# Patient Record
Sex: MALE | Race: WHITE | Employment: OTHER | URBAN - METROPOLITAN AREA
[De-identification: names, ages, dates, MRNs, and addresses within clinical notes are randomized per-mention and may not be internally consistent; named-entity substitution may affect disease eponyms.]

---

## 2017-02-06 RX ORDER — ATORVASTATIN CALCIUM 20 MG/1
TABLET, FILM COATED ORAL
Qty: 90 TABLET | Refills: 0 | Status: SHIPPED | OUTPATIENT
Start: 2017-02-06 | End: 2017-04-29

## 2017-02-07 ENCOUNTER — OFFICE VISIT (OUTPATIENT)
Dept: FAMILY MEDICINE CLINIC | Facility: CLINIC | Age: 72
End: 2017-02-07

## 2017-02-07 VITALS
SYSTOLIC BLOOD PRESSURE: 108 MMHG | HEIGHT: 73 IN | RESPIRATION RATE: 12 BRPM | DIASTOLIC BLOOD PRESSURE: 74 MMHG | WEIGHT: 181 LBS | TEMPERATURE: 97 F | BODY MASS INDEX: 23.99 KG/M2 | HEART RATE: 64 BPM

## 2017-02-07 DIAGNOSIS — E78.00 PURE HYPERCHOLESTEROLEMIA: Primary | ICD-10-CM

## 2017-02-07 DIAGNOSIS — E04.2 MULTINODULAR THYROID: ICD-10-CM

## 2017-02-07 DIAGNOSIS — Z00.00 ENCOUNTER FOR ANNUAL HEALTH EXAMINATION: ICD-10-CM

## 2017-02-07 DIAGNOSIS — R20.9 DISTURBANCE OF SKIN SENSATION: ICD-10-CM

## 2017-02-07 DIAGNOSIS — N52.9 ERECTILE DYSFUNCTION, UNSPECIFIED ERECTILE DYSFUNCTION TYPE: ICD-10-CM

## 2017-02-07 PROCEDURE — 99213 OFFICE O/P EST LOW 20 MIN: CPT | Performed by: FAMILY MEDICINE

## 2017-02-07 PROCEDURE — G0439 PPPS, SUBSEQ VISIT: HCPCS | Performed by: FAMILY MEDICINE

## 2017-02-07 NOTE — PROGRESS NOTES
HPI:   Leo Segovia is a 66year old male who presents for a Medicare Subsequent Annual Wellness visit (Pt already had Initial Annual Wellness). Leo Segovia is a 66year old male who presents for recheck of hyperlipidemia.  Patient reports t Cancer of skin, face; Chest pain; Thyroid nodule; and Cancer ([facility] [address] He  has past surgical history that includes tonsillectomy (1947); hernia surgery (1995); other surgical history (1994); and cholecystectomy (12/29/11).     His family history includes Ca mucosa, and tongue normal; teeth and gums normal   Neck: Supple, symmetrical, trachea midline, no adenopathy, thyroid: not enlarged, symmetric, no tenderness/mass/nodules, no carotid bruit or JVD   Lungs:   Clear to auscultation bilaterally, respirations u  on file in Epic:    Nora Bills does not have a Power of  for [employer] on file in [address] [place] [address] Rd. Discussed with patient and provided information          PLAN:  The patient indicates understanding of these issues and agrees to the plan. affect your day to day activities?: 0-No     Have you had any memory issues?: 0-No    Fall/Risk Scorin    Scoring Interpretation: 0 - 3 No Risk     Depression Screening (PHQ-2/PHQ-9): Over the LAST 2 WEEKS   Little interest or pleasure in doing things Annually No results found for: FOB No flowsheet data found. Glaucoma Screening      Ophthalmology Visit Annually: Diabetics, FHx Glaucoma, AA>50, > 65 No flowsheet data found.     Prostate Cancer Screening      PSA  Annually PSA due on 12/09/2018

## 2017-02-07 NOTE — PATIENT INSTRUCTIONS
Hever Curtis's SCREENING SCHEDULE   Tests on this list are recommended by your physician but may not be covered, or covered at this frequency, by your insurer. Please check with your insurance carrier before scheduling to verify coverage.     PREVENT to patients who meet one of the following criteria:   • Men who are 73-68 years old and have smoked more than 100 cigarettes in their lifetime   • Anyone with a family history    Colorectal Cancer Screening Covered up to Age 76     Colonoscopy Screen   Cov Homosexual men   Illicit injectable drug abusers     Tetanus Toxoid- Only covered with a cut with metal- TD and TDaP Not covered by Medicare Part B) No orders found for this or any previous visit.  This may be covered with your prescription benefits, but

## 2017-02-12 PROBLEM — E04.2 MULTINODULAR THYROID: Status: ACTIVE | Noted: 2017-02-12

## 2017-02-12 PROBLEM — N52.9 ERECTILE DYSFUNCTION: Status: ACTIVE | Noted: 2017-02-12

## 2017-03-08 ENCOUNTER — PATIENT MESSAGE (OUTPATIENT)
Dept: FAMILY MEDICINE CLINIC | Facility: CLINIC | Age: 72
End: 2017-03-08

## 2017-03-09 NOTE — TELEPHONE ENCOUNTER
From: Dixie Pop  To: Yandy Mcgee MD  Sent: 3/8/2017 9:28 AM CST  Subject: Other    Received my pneumonia booster shot yesterday 3/7/17. Please add it to my records.  Also, [facility] records only go back 4 years and they don't show me hav

## 2017-05-01 RX ORDER — ATORVASTATIN CALCIUM 20 MG/1
TABLET, FILM COATED ORAL
Qty: 90 TABLET | Refills: 0 | Status: SHIPPED | OUTPATIENT
Start: 2017-05-01 | End: 2017-07-27

## 2017-07-27 RX ORDER — ATORVASTATIN CALCIUM 20 MG/1
20 TABLET, FILM COATED ORAL
Qty: 90 TABLET | Refills: 1 | Status: SHIPPED | OUTPATIENT
Start: 2017-07-27 | End: 2018-01-25

## 2017-08-25 ENCOUNTER — HOSPITAL ENCOUNTER (OUTPATIENT)
Dept: ULTRASOUND IMAGING | Facility: HOSPITAL | Age: 72
Discharge: HOME | End: 2017-08-25
Attending: OTOLARYNGOLOGY
Payer: MEDICARE

## 2017-08-25 DIAGNOSIS — E04.1 THYROID NODULE: ICD-10-CM

## 2017-08-25 PROCEDURE — 76536 US EXAM OF HEAD AND NECK: CPT | Performed by: OTOLARYNGOLOGY

## 2017-12-11 ENCOUNTER — APPOINTMENT (OUTPATIENT)
Dept: GENERAL RADIOLOGY | Facility: HOSPITAL | Age: 72
End: 2017-12-11
Payer: MEDICARE

## 2017-12-11 ENCOUNTER — APPOINTMENT (OUTPATIENT)
Dept: CV DIAGNOSTICS | Facility: HOSPITAL | Age: 72
End: 2017-12-11
Attending: EMERGENCY MEDICINE
Payer: MEDICARE

## 2017-12-11 ENCOUNTER — HOSPITAL ENCOUNTER (EMERGENCY)
Facility: HOSPITAL | Age: 72
Discharge: HOME | End: 2017-12-11
Attending: EMERGENCY MEDICINE
Payer: MEDICARE

## 2017-12-11 VITALS
HEIGHT: 74 IN | DIASTOLIC BLOOD PRESSURE: 72 MMHG | SYSTOLIC BLOOD PRESSURE: 127 MMHG | TEMPERATURE: 98 F | OXYGEN SATURATION: 98 % | RESPIRATION RATE: 16 BRPM | BODY MASS INDEX: 22.46 KG/M2 | WEIGHT: 175 LBS | HEART RATE: 70 BPM

## 2017-12-11 DIAGNOSIS — R07.89 CHEST PAIN, NON-CARDIAC: Primary | ICD-10-CM

## 2017-12-11 PROCEDURE — 93350 STRESS TTE ONLY: CPT | Performed by: EMERGENCY MEDICINE

## 2017-12-11 PROCEDURE — 36415 COLL VENOUS BLD VENIPUNCTURE: CPT

## 2017-12-11 PROCEDURE — 84484 ASSAY OF TROPONIN QUANT: CPT

## 2017-12-11 PROCEDURE — 80053 COMPREHEN METABOLIC PANEL: CPT | Performed by: EMERGENCY MEDICINE

## 2017-12-11 PROCEDURE — 85025 COMPLETE CBC W/AUTO DIFF WBC: CPT | Performed by: EMERGENCY MEDICINE

## 2017-12-11 PROCEDURE — 80053 COMPREHEN METABOLIC PANEL: CPT

## 2017-12-11 PROCEDURE — 99285 EMERGENCY DEPT VISIT HI MDM: CPT

## 2017-12-11 PROCEDURE — 93010 ELECTROCARDIOGRAM REPORT: CPT

## 2017-12-11 PROCEDURE — 93018 CV STRESS TEST I&R ONLY: CPT | Performed by: EMERGENCY MEDICINE

## 2017-12-11 PROCEDURE — 71010 XR CHEST AP PORTABLE  (CPT=71010): CPT

## 2017-12-11 PROCEDURE — 85025 COMPLETE CBC W/AUTO DIFF WBC: CPT

## 2017-12-11 PROCEDURE — 84484 ASSAY OF TROPONIN QUANT: CPT | Performed by: EMERGENCY MEDICINE

## 2017-12-11 PROCEDURE — 93005 ELECTROCARDIOGRAM TRACING: CPT

## 2017-12-11 PROCEDURE — 93017 CV STRESS TEST TRACING ONLY: CPT | Performed by: EMERGENCY MEDICINE

## 2017-12-11 PROCEDURE — 85378 FIBRIN DEGRADE SEMIQUANT: CPT | Performed by: PHYSICIAN ASSISTANT

## 2017-12-11 PROCEDURE — 85610 PROTHROMBIN TIME: CPT | Performed by: PHYSICIAN ASSISTANT

## 2017-12-11 RX ORDER — ASPIRIN 81 MG/1
324 TABLET, CHEWABLE ORAL ONCE
Status: COMPLETED | OUTPATIENT
Start: 2017-12-11 | End: 2017-12-11

## 2017-12-11 NOTE — ED INITIAL ASSESSMENT (HPI)
Pt c/o chest pain for past week with sharp pain with deep inspiration, stiffness to L arm, intermittent JOSH, denies cough, n/v.

## 2018-01-25 DIAGNOSIS — E78.00 PURE HYPERCHOLESTEROLEMIA: Primary | ICD-10-CM

## 2018-01-25 RX ORDER — ATORVASTATIN CALCIUM 20 MG/1
TABLET, FILM COATED ORAL
Qty: 90 TABLET | Refills: 0 | Status: SHIPPED | OUTPATIENT
Start: 2018-01-25 | End: 2018-05-05

## 2018-01-25 NOTE — TELEPHONE ENCOUNTER
I have sent rx but pt is due for vs next month for cholesterol, overdue for labs too, in system, fasting. Please have him schedule thanks.

## 2018-03-08 ENCOUNTER — OFFICE VISIT (OUTPATIENT)
Dept: FAMILY MEDICINE CLINIC | Facility: CLINIC | Age: 73
End: 2018-03-08

## 2018-03-08 VITALS
SYSTOLIC BLOOD PRESSURE: 130 MMHG | DIASTOLIC BLOOD PRESSURE: 78 MMHG | RESPIRATION RATE: 18 BRPM | TEMPERATURE: 98 F | HEIGHT: 73 IN | BODY MASS INDEX: 24.52 KG/M2 | WEIGHT: 185 LBS | OXYGEN SATURATION: 98 % | HEART RATE: 65 BPM

## 2018-03-08 DIAGNOSIS — R42 VERTIGO: Primary | ICD-10-CM

## 2018-03-08 PROCEDURE — 99214 OFFICE O/P EST MOD 30 MIN: CPT | Performed by: FAMILY MEDICINE

## 2018-03-08 NOTE — PROGRESS NOTES
Debby Mtz is a 74year old male. HPI:   Last night felt a little wobbly when he got up. Walked up to go to bed and developed vertigo. Lasted 15-20 minutes all told. No recent URI's. No head injury. No chest pain or SOB. No palpitations.      Anila Zamora adenopathy,no bruits  LUNGS: clear to auscultation  CARDIO: RRR without murmur  EXTREMITIES: no cyanosis, clubbing or edema  NEURO: Cranial nerves II through XII are grossly intact, specifically no evidence of nystagmus    ASSESSMENT AND PLAN:   Vertigo  (

## 2018-05-05 DIAGNOSIS — E78.00 PURE HYPERCHOLESTEROLEMIA: ICD-10-CM

## 2018-05-07 RX ORDER — ATORVASTATIN CALCIUM 20 MG/1
TABLET, FILM COATED ORAL
Qty: 90 TABLET | Refills: 0 | Status: SHIPPED | OUTPATIENT
Start: 2018-05-07 | End: 2018-08-09

## 2018-05-15 ENCOUNTER — LAB ENCOUNTER (OUTPATIENT)
Dept: LAB | Age: 73
End: 2018-05-15
Attending: FAMILY MEDICINE
Payer: MEDICARE

## 2018-05-15 DIAGNOSIS — E78.00 PURE HYPERCHOLESTEROLEMIA: ICD-10-CM

## 2018-05-15 PROCEDURE — 36415 COLL VENOUS BLD VENIPUNCTURE: CPT

## 2018-05-15 PROCEDURE — 80061 LIPID PANEL: CPT

## 2018-05-15 PROCEDURE — 80053 COMPREHEN METABOLIC PANEL: CPT

## 2018-05-18 ENCOUNTER — APPOINTMENT (OUTPATIENT)
Dept: LAB | Age: 73
End: 2018-05-18
Attending: FAMILY MEDICINE
Payer: MEDICARE

## 2018-05-18 ENCOUNTER — OFFICE VISIT (OUTPATIENT)
Dept: FAMILY MEDICINE CLINIC | Facility: CLINIC | Age: 73
End: 2018-05-18

## 2018-05-18 VITALS
SYSTOLIC BLOOD PRESSURE: 134 MMHG | RESPIRATION RATE: 12 BRPM | HEIGHT: 73 IN | DIASTOLIC BLOOD PRESSURE: 70 MMHG | HEART RATE: 60 BPM | BODY MASS INDEX: 24.12 KG/M2 | WEIGHT: 182 LBS | TEMPERATURE: 98 F

## 2018-05-18 DIAGNOSIS — R20.9 DISTURBANCE OF SKIN SENSATION: ICD-10-CM

## 2018-05-18 DIAGNOSIS — Z00.00 ENCOUNTER FOR ANNUAL HEALTH EXAMINATION: Primary | ICD-10-CM

## 2018-05-18 DIAGNOSIS — N40.0 ENLARGED PROSTATE WITHOUT LOWER URINARY TRACT SYMPTOMS (LUTS): ICD-10-CM

## 2018-05-18 DIAGNOSIS — E78.00 PURE HYPERCHOLESTEROLEMIA: ICD-10-CM

## 2018-05-18 DIAGNOSIS — Z12.5 SCREENING FOR PROSTATE CANCER: ICD-10-CM

## 2018-05-18 DIAGNOSIS — E04.2 MULTINODULAR THYROID: ICD-10-CM

## 2018-05-18 PROCEDURE — 99213 OFFICE O/P EST LOW 20 MIN: CPT | Performed by: FAMILY MEDICINE

## 2018-05-18 PROCEDURE — 36415 COLL VENOUS BLD VENIPUNCTURE: CPT

## 2018-05-18 PROCEDURE — G0439 PPPS, SUBSEQ VISIT: HCPCS | Performed by: FAMILY MEDICINE

## 2018-05-18 PROCEDURE — 84153 ASSAY OF PSA TOTAL: CPT

## 2018-05-18 NOTE — PATIENT INSTRUCTIONS
Hever Curtis's SCREENING SCHEDULE   Tests on this list are recommended by your physician but may not be covered, or covered at this frequency, by your insurer. Please check with your insurance carrier before scheduling to verify coverage.     PREVENT Visit    Abdominal aortic aneurysm screening (once between ages 73-68)  No results found for this or any previous visit.  Limited to patients who meet one of the following criteria:   • Men who are 73-68 years old and have smoked more than 100 cigarettes in concentrates   Clients of institutions for the mentally retarded   Persons who live in the same house as a HepB virus carrier   Homosexual men   Illicit injectable drug abusers     Tetanus Toxoid- Only covered with a cut with metal- TD and TDaP Not covered

## 2018-05-18 NOTE — PROGRESS NOTES
HPI:   Ju Flores is a 74year old male who presents for a Medicare Subsequent Annual Wellness visit (Pt already had Initial Annual Wellness). Ju Flores is a 74year old male who presents for recheck of hyperlipidemia.  Patient reports t Cancer ([facility] [address] Cancer of skin, face; Chest pain; and Thyroid nodule. He  has a past surgical history that includes tonsillectomy (1947); hernia surgery (1995); other surgical history (1994); and cholecystectomy (12/29/11).     His family history includes to auscultation bilaterally, respirations unlabored   Chest Wall:  No tenderness or deformity   Heart:  Regular rate and rhythm, S1, S2 normal, no murmur, rub or gallop   Extremities: Extremities normal, atraumatic, no cyanosis or edema   Pulses: 2+ and sy General Health     In the past six months, have you lost more than 10 pounds without trying?: 2 - No    Has your appetite been poor?: No    How does the patient maintain a good energy level?: Stretching    How would you describe your daily physical a input here.   Cognitive Assessment     What day of the week is this?: Correct    What month is it?: Correct    What year is it?: Correct    Recall \"Ball\": Correct    Recall \"Flag\": Correct    Recall \"Tree\": Correct       This section provided for quic or any previous visit. Tetanus No orders found for this or any previous visit.          SPECIFIC DISEASE MONITORING Internal Lab or Procedure External Lab or Procedure   Annual Monitoring of Persistent     Medications (ACE/ARB, digoxin diuretics, antico

## 2018-08-09 DIAGNOSIS — E78.00 PURE HYPERCHOLESTEROLEMIA: ICD-10-CM

## 2018-08-10 RX ORDER — ATORVASTATIN CALCIUM 20 MG/1
TABLET, FILM COATED ORAL
Qty: 90 TABLET | Refills: 0 | Status: SHIPPED | OUTPATIENT
Start: 2018-08-10 | End: 2018-11-08

## 2018-10-04 ENCOUNTER — TELEPHONE (OUTPATIENT)
Dept: FAMILY MEDICINE CLINIC | Facility: CLINIC | Age: 73
End: 2018-10-04

## 2018-10-25 ENCOUNTER — OFFICE VISIT (OUTPATIENT)
Dept: FAMILY MEDICINE CLINIC | Facility: CLINIC | Age: 73
End: 2018-10-25
Payer: MEDICARE

## 2018-10-25 ENCOUNTER — LAB ENCOUNTER (OUTPATIENT)
Dept: LAB | Age: 73
End: 2018-10-25
Attending: OTOLARYNGOLOGY
Payer: MEDICARE

## 2018-10-25 VITALS
HEART RATE: 68 BPM | HEIGHT: 73 IN | BODY MASS INDEX: 23.99 KG/M2 | WEIGHT: 181 LBS | RESPIRATION RATE: 16 BRPM | DIASTOLIC BLOOD PRESSURE: 72 MMHG | SYSTOLIC BLOOD PRESSURE: 130 MMHG | TEMPERATURE: 98 F

## 2018-10-25 DIAGNOSIS — E04.2 MULTINODULAR THYROID: ICD-10-CM

## 2018-10-25 DIAGNOSIS — S46.011A STRAIN OF TENDON OF RIGHT ROTATOR CUFF, INITIAL ENCOUNTER: Primary | ICD-10-CM

## 2018-10-25 DIAGNOSIS — E07.9 THYROID DYSFUNCTION: ICD-10-CM

## 2018-10-25 PROCEDURE — 99213 OFFICE O/P EST LOW 20 MIN: CPT | Performed by: FAMILY MEDICINE

## 2018-10-25 PROCEDURE — 84443 ASSAY THYROID STIM HORMONE: CPT

## 2018-10-25 PROCEDURE — 36415 COLL VENOUS BLD VENIPUNCTURE: CPT

## 2018-10-25 PROCEDURE — 84439 ASSAY OF FREE THYROXINE: CPT

## 2018-10-25 NOTE — PROGRESS NOTES
Right shoulder pain    Patient is here with chief complaint of right shoulder pain that he has had for several months. He has no history of any injury.   He finds that it does not interfere with golfing but has difficulty reaching for objects above his carroll

## 2018-10-31 ENCOUNTER — OFFICE VISIT (OUTPATIENT)
Dept: PHYSICAL THERAPY | Age: 73
End: 2018-10-31
Attending: FAMILY MEDICINE
Payer: MEDICARE

## 2018-10-31 DIAGNOSIS — S46.011A STRAIN OF TENDON OF RIGHT ROTATOR CUFF, INITIAL ENCOUNTER: ICD-10-CM

## 2018-10-31 PROCEDURE — 97110 THERAPEUTIC EXERCISES: CPT

## 2018-10-31 PROCEDURE — 97162 PT EVAL MOD COMPLEX 30 MIN: CPT

## 2018-10-31 NOTE — PROGRESS NOTES
SPINE EVALUATION:   Referring Physician: Dr. Eda Umana  Diagnosis: right shoulder sub acromial impingement with rotator cuff dysfunction     Date of Service: 10/31/2018     PATIENT SUMMARY   Hunter Trujillo is a 74year old y/o male who presents to  None  OBJECTIVE:   Observation/Posture: winging noted  In the right scapula is mild, impaired scapulohumeral rhthym with elevation.      Shoulder ROM:   Flexion: R 135* L170  Abduction: R 140* L 170  External rotation with arm by side: R 31*; L 71  Internal deodorant, don/doff shirts, and wash hair (12 visits)  · Pt will improve shoulder strength to 5-/5 to improve function with ADLs such as carrying a bag of groceries (12 visits)  · Pt will demonstrate increased mid/low trap strength to 5/5 to promote improv

## 2018-11-06 ENCOUNTER — OFFICE VISIT (OUTPATIENT)
Dept: PHYSICAL THERAPY | Age: 73
End: 2018-11-06
Attending: FAMILY MEDICINE
Payer: MEDICARE

## 2018-11-06 PROCEDURE — 97110 THERAPEUTIC EXERCISES: CPT

## 2018-11-06 PROCEDURE — 97140 MANUAL THERAPY 1/> REGIONS: CPT

## 2018-11-06 NOTE — PROGRESS NOTES
Dx: right shoulder sub acromial impingement with rotator cuff dysfunction            Authorized # of Visits:  12 requested          Next MD visit: none scheduled  Fall Risk: standard         Precautions: n/a           Subjective: states he did do the exerc TX#: 8/   UBE x 6 min level 1          HEP review: Isometric IR red band and ER (yellow band)   Reassess ROM           Hook lying  -GIII Posterior mobilization 10 x 2 with arm in neutral second set in slight IR   -GIII Inferior mobilization 10 x 2 with

## 2018-11-08 ENCOUNTER — OFFICE VISIT (OUTPATIENT)
Dept: PHYSICAL THERAPY | Age: 73
End: 2018-11-08
Attending: FAMILY MEDICINE
Payer: MEDICARE

## 2018-11-08 DIAGNOSIS — E78.00 PURE HYPERCHOLESTEROLEMIA: ICD-10-CM

## 2018-11-08 PROCEDURE — 97110 THERAPEUTIC EXERCISES: CPT

## 2018-11-08 PROCEDURE — 97140 MANUAL THERAPY 1/> REGIONS: CPT

## 2018-11-08 RX ORDER — ATORVASTATIN CALCIUM 20 MG/1
TABLET, FILM COATED ORAL
Qty: 90 TABLET | Refills: 0 | Status: SHIPPED | OUTPATIENT
Start: 2018-11-08 | End: 2018-11-15 | Stop reason: DRUGHIGH

## 2018-11-08 NOTE — PROGRESS NOTES
Dx: right shoulder sub acromial impingement with rotator cuff dysfunction            Authorized # of Visits:  12 requested          Next MD visit: none scheduled  Fall Risk: standard         Precautions: n/a           Subjective: soreness noted after doing ER (yellow band)   Reassess ROM   Prone retraction for scapular activation 15 x 2         Hook lying  -GIII Posterior mobilization 10 x 2 with arm in neutral second set in slight IR   -GIII Inferior mobilization 10 x 2 with arm at 45 deg, 10 x 1 at 60 deg

## 2018-11-13 ENCOUNTER — OFFICE VISIT (OUTPATIENT)
Dept: PHYSICAL THERAPY | Age: 73
End: 2018-11-13
Attending: FAMILY MEDICINE
Payer: MEDICARE

## 2018-11-13 PROCEDURE — 97110 THERAPEUTIC EXERCISES: CPT

## 2018-11-13 PROCEDURE — 97140 MANUAL THERAPY 1/> REGIONS: CPT

## 2018-11-13 NOTE — PROGRESS NOTES
Dx: right shoulder sub acromial impingement with rotator cuff dysfunction            Authorized # of Visits:  12 requested          Next MD visit: none scheduled  Fall Risk: standard         Precautions: n/a           Subjective: soreness noted after doing retraction for scapular activation 15 x 2  1# for the third set        Hook lying  -GIII Posterior mobilization 10 x 2 with arm in neutral second set in slight IR   -GIII Inferior mobilization 10 x 2 with arm at 45 deg, 10 x 1 at 60 deg  Hook lying  -GIII specific exercises selected based on impairments to improve ROM, strength and/or motor control; education,  Demonstration and cueing to achieve optimal improvement and decrease risk of injury  Charges: MT x 1 TE x 2       Total Timed Treatment: 43 min  Tot

## 2018-11-14 ENCOUNTER — LAB ENCOUNTER (OUTPATIENT)
Dept: LAB | Age: 73
End: 2018-11-14
Attending: FAMILY MEDICINE
Payer: MEDICARE

## 2018-11-14 DIAGNOSIS — E07.9 THYROID DYSFUNCTION: ICD-10-CM

## 2018-11-14 DIAGNOSIS — E78.00 PURE HYPERCHOLESTEROLEMIA: ICD-10-CM

## 2018-11-14 PROCEDURE — 36415 COLL VENOUS BLD VENIPUNCTURE: CPT

## 2018-11-14 PROCEDURE — 84439 ASSAY OF FREE THYROXINE: CPT

## 2018-11-14 PROCEDURE — 84443 ASSAY THYROID STIM HORMONE: CPT

## 2018-11-14 PROCEDURE — 80061 LIPID PANEL: CPT

## 2018-11-14 PROCEDURE — 80053 COMPREHEN METABOLIC PANEL: CPT

## 2018-11-15 ENCOUNTER — APPOINTMENT (OUTPATIENT)
Dept: PHYSICAL THERAPY | Age: 73
End: 2018-11-15
Attending: FAMILY MEDICINE
Payer: MEDICARE

## 2018-11-15 PROCEDURE — 97140 MANUAL THERAPY 1/> REGIONS: CPT

## 2018-11-15 PROCEDURE — 97110 THERAPEUTIC EXERCISES: CPT

## 2018-11-15 RX ORDER — ATORVASTATIN CALCIUM 40 MG/1
40 TABLET, FILM COATED ORAL NIGHTLY
Qty: 90 TABLET | Refills: 0 | Status: SHIPPED | OUTPATIENT
Start: 2018-11-15 | End: 2019-02-01

## 2018-11-15 NOTE — PROGRESS NOTES
Dx: right shoulder sub acromial impingement with rotator cuff dysfunction            Authorized # of Visits:  12 requested          Next MD visit: none scheduled  Fall Risk: standard         Precautions: n/a           Subjective: slept great last night for scapular activation 15 x 2  Prone retraction for scapular activation 15 x 2  1# for the third set  Prone retraction for scapular activation 15 x 2 1#   Row 2# prone with scap       Hook lying  -GIII Posterior mobilization 10 x 2 with arm in neutral second YTB  Scap retraction with iso ER seated 15 x 2 YTB  Scap retraction with iso ER seated 15 x 2 YTB  Wall scap retraction 10 x 2 sets with YTB Scap retraction with iso ER seated 15 x 2 YTB  Wall scap retraction 10 x 2 sets with YTB       Cane AAROM flexion 1

## 2018-11-19 ENCOUNTER — OFFICE VISIT (OUTPATIENT)
Dept: PHYSICAL THERAPY | Age: 73
End: 2018-11-19
Attending: FAMILY MEDICINE
Payer: MEDICARE

## 2018-11-19 PROCEDURE — 97140 MANUAL THERAPY 1/> REGIONS: CPT

## 2018-11-19 PROCEDURE — 97110 THERAPEUTIC EXERCISES: CPT

## 2018-11-19 NOTE — PROGRESS NOTES
Dx: right shoulder sub acromial impingement with rotator cuff dysfunction            Authorized # of Visits:  12 requested          Next MD visit: none scheduled  Fall Risk: standard         Precautions: n/a           Subjective: states that the shoulder h 6 min level 1  UBE x 6 min level 1  UBE x 6 min level 1  UBE x 6 min level 1 UBE x 6 min level 1     HEP review: Isometric IR red band and ER (yellow band)   Reassess ROM   Prone retraction for scapular activation 15 x 2  Prone retraction for scapular acti 2 (small range)  Standing IR RTB resisted with towel roll under arm  15 x 2  Standing ER YTB resisted with towel under arm 15 x 2 (small range)  Standing IR RTB resisted with towel roll under arm  15 x 2  ER and IR standing RTB both 15 x 2  ER and IR stand

## 2018-11-21 ENCOUNTER — OFFICE VISIT (OUTPATIENT)
Dept: PHYSICAL THERAPY | Age: 73
End: 2018-11-21
Attending: FAMILY MEDICINE
Payer: MEDICARE

## 2018-11-21 PROCEDURE — 97110 THERAPEUTIC EXERCISES: CPT

## 2018-11-21 PROCEDURE — 97140 MANUAL THERAPY 1/> REGIONS: CPT

## 2018-11-21 NOTE — PROGRESS NOTES
Dx: right shoulder sub acromial impingement with rotator cuff dysfunction            Authorized # of Visits:  12 requested          Next MD visit: none scheduled  Fall Risk: standard         Precautions: n/a           Subjective: not sure if he is making g 1  UBE x 6 min level 1  UBE x 6 min level 1 UBE x 6 min level 1 UBE x 6 min level 1    HEP review: Isometric IR red band and ER (yellow band)   Reassess ROM   Prone retraction for scapular activation 15 x 2  Prone retraction for scapular activation 15 x 2 below shoulder height 12 x 2   Abduction 12 x 2 below shoulder height    Standing Small above painful arc 10 x 2 flex     Standing ER YTB resisted with towel under arm 15 x 2 (small range)  Standing IR RTB resisted with towel roll under arm  15 x 2  Standi

## 2018-11-26 ENCOUNTER — APPOINTMENT (OUTPATIENT)
Dept: PHYSICAL THERAPY | Age: 73
End: 2018-11-26
Attending: FAMILY MEDICINE
Payer: MEDICARE

## 2018-11-27 ENCOUNTER — OFFICE VISIT (OUTPATIENT)
Dept: PHYSICAL THERAPY | Age: 73
End: 2018-11-27
Attending: FAMILY MEDICINE
Payer: MEDICARE

## 2018-11-27 PROCEDURE — 97112 NEUROMUSCULAR REEDUCATION: CPT

## 2018-11-27 PROCEDURE — 97110 THERAPEUTIC EXERCISES: CPT

## 2018-11-27 NOTE — PROGRESS NOTES
Dx: right shoulder sub acromial impingement with rotator cuff dysfunction            Authorized # of Visits:  12 requested          Next MD visit: none scheduled  Fall Risk: standard         Precautions: n/a           Subjective: states the nights are bad TX#: 6/ Date: 11/21/2018               TX#: 7/ Date: 11/27/2018                 TX#: 8/   UBE x 6 min level 1  UBE x 6 min level 1  UBE x 6 min level 1  UBE x 6 min level 1 UBE x 6 min level 1 UBE x 6 min level 1 UBE x 6 min level 1   HEP review: Iso flexion with slow PNF Rhythmic stabilizaiton in all directions   20 sec x 5 reps with 20-30 sec breaks   Hook lying Flexion 15 x 2, avoid end range at top  Sustained flexion with slow PNF Rhythmic stabilizaiton in all directions   20 sec x 5 reps with 20-3 stabilization and forward arm taps 6 x 2 both arms  Plank with scap retraction sustained 30 sec x 2    Push up plus of scaps x 10 x 2     Plank  With stabilization and forward arm taps 6 x 2 both arms    Skilled Services: manual therapy to include interven

## 2018-11-29 ENCOUNTER — OFFICE VISIT (OUTPATIENT)
Dept: PHYSICAL THERAPY | Age: 73
End: 2018-11-29
Attending: FAMILY MEDICINE
Payer: MEDICARE

## 2018-11-29 PROCEDURE — 97110 THERAPEUTIC EXERCISES: CPT

## 2018-11-29 PROCEDURE — 97112 NEUROMUSCULAR REEDUCATION: CPT

## 2018-11-29 NOTE — PROGRESS NOTES
Dx: right shoulder sub acromial impingement with rotator cuff dysfunction            Authorized # of Visits:  12 requested          Next MD visit: none scheduled  Fall Risk: standard         Precautions: n/a           Subjective: feel like I am making prog level 1  UBE x 6 min level 1 UBE x 6 min level 1 UBE x 6 min level 1 UBE x 6 min level 1 UBE x 6 min level 1   HEP review: Isometric IR red band and ER (yellow band)   Reassess ROM   Prone retraction for scapular activation 15 x 2  Prone retraction for sca flexion with slow PNF Rhythmic stabilizaiton in all directions   20 sec x 5 reps with 20-30 sec breaks   Hook lying Flexion 15 x 2, avoid end range at top  Sustained flexion with slow PNF Rhythmic stabilizaiton in all directions   20 sec x 5 reps with 20-3 30 sec x 2    Push up plus of scaps x 10 x 2     Plank  With stabilization and forward arm taps 6 x 2 both arms  Plank with scap retraction sustained 30 sec x 2    Push up plus of scaps x 10 x 2     Plank  With stabilization and forward arm taps 6 x 2 both

## 2018-12-03 ENCOUNTER — OFFICE VISIT (OUTPATIENT)
Dept: PHYSICAL THERAPY | Age: 73
End: 2018-12-03
Attending: FAMILY MEDICINE
Payer: MEDICARE

## 2018-12-03 PROCEDURE — 97110 THERAPEUTIC EXERCISES: CPT

## 2018-12-03 PROCEDURE — 97112 NEUROMUSCULAR REEDUCATION: CPT

## 2018-12-03 NOTE — PROGRESS NOTES
Dx: right shoulder sub acromial impingement with rotator cuff dysfunction            Authorized # of Visits:  12 requested          Next MD visit: none scheduled  Fall Risk: standard         Precautions: n/a           Subjective: did not do exercises over extension off the edge, 10 x with scap assist  Flexion small ROM with scap assist 10x  Prone shoulder extension off the edge, 10 x with scap assist  Flexion small ROM with scap assist 10x  Prone shoulder extension off the edge, 10 x with scap assist  Flexi 30 sec x 2    Push up plus of scaps x 10 x 2     Plank  With stabilization and forward arm taps 6 x 2 both arms  Plank with scap retraction sustained 30 sec x 2    Push up plus of scaps x 10 x 2     Plank  With stabilization and forward arm taps 6 x 2 both

## 2018-12-06 ENCOUNTER — OFFICE VISIT (OUTPATIENT)
Dept: PHYSICAL THERAPY | Age: 73
End: 2018-12-06
Attending: FAMILY MEDICINE
Payer: MEDICARE

## 2018-12-06 PROCEDURE — 97110 THERAPEUTIC EXERCISES: CPT

## 2018-12-06 PROCEDURE — 97112 NEUROMUSCULAR REEDUCATION: CPT

## 2018-12-06 NOTE — PROGRESS NOTES
Dx: right shoulder sub acromial impingement with rotator cuff dysfunction            Authorized # of Visits:  15-18 requested          Next MD visit: none scheduled  Fall Risk: standard         Precautions: n/a           Subjective:improving sleep quality, activation 15 x 2 2#   Row 2# prone with scap  Prone retraction for scapular activation 15 x 2 2#   Row 2# prone with scap Prone shoulder extension off the edge, 10 x with scap assist  Flexion small ROM with scap assist 10x  Prone shoulder extension off th with iso ER seated 15 x 2 YTB  Wall scap retraction 10 x 2 sets with YTB Scap retraction with iso ER seated 15 x 2 YTB  Wall scap retraction 10 x 2 sets with YTB Serratus wall slides foam roller on the wall 10 x 2  Serratus wall slides foam roller on the w

## 2018-12-12 ENCOUNTER — OFFICE VISIT (OUTPATIENT)
Dept: PHYSICAL THERAPY | Age: 73
End: 2018-12-12
Attending: FAMILY MEDICINE
Payer: MEDICARE

## 2018-12-12 PROCEDURE — 97140 MANUAL THERAPY 1/> REGIONS: CPT

## 2018-12-12 PROCEDURE — 97110 THERAPEUTIC EXERCISES: CPT

## 2018-12-12 NOTE — PROGRESS NOTES
Dx: right shoulder sub acromial impingement with rotator cuff dysfunction            Authorized # of Visits:  15-18 requested          Next MD visit: none scheduled  Fall Risk: standard         Precautions: n/a           Subjective:noticing less pain - but Prone retraction for scapular activation 15 x 2 2#   Row 2# prone with scap  Prone retraction for scapular activation 15 x 2 2#   Row 2# prone with scap Prone shoulder extension off the edge, 10 x with scap assist  Flexion small ROM with scap assist 10x  P 2 # and IR with RTB    15 x 2  ER and IR standing GTB both 15 x 2    Seated 45 deg   ER 2 # and IR with RTB    15 x 2  ER and IR standing GTB both 15 x 2    Seated 45 deg   ER 2 # and IR with RTB    15 x 2 Seated 45 deg   ER 3 # and IR with GTB    15 x 2 Push up plus of scaps x 10 x 2     Plank  With stabilization and forward arm taps 10 x 2 both arms  Plank with lateral taps and serratus 10 x 2   Skilled Services: manual therapy to include interventions to address impairments, specific exercises select

## 2018-12-18 ENCOUNTER — OFFICE VISIT (OUTPATIENT)
Dept: PHYSICAL THERAPY | Age: 73
End: 2018-12-18
Attending: FAMILY MEDICINE
Payer: MEDICARE

## 2018-12-18 PROCEDURE — 97140 MANUAL THERAPY 1/> REGIONS: CPT

## 2018-12-18 PROCEDURE — 97110 THERAPEUTIC EXERCISES: CPT

## 2018-12-18 NOTE — PROGRESS NOTES
Dx: right shoulder sub acromial impingement with rotator cuff dysfunction            Authorized # of Visits:  15-18 requested          Next MD visit: none scheduled  Fall Risk: standard         Precautions: n/a           Subjective:2/5 nights he had night the edge, 10 x with scap assist  Flexion small ROM with scap assist 10x    AC joint mob 10 x 2 G III       Supine D2 Flexion and extension  YTB 10 x 2  Supine D2 Flexion and extension  YTB 10 x 2  Supine D2 and D1 Flexion and extension  YTB 10 x 2       G 2 Serratus wall slides foam roller on the wall 10 x 2   Plank with scap retraction sustained 30 sec x 2    Push up plus of scaps x 10 x 2     Plank  With stabilization and forward arm taps 6 x 2 both arms  Plank with scap retraction sustained 30 sec x 2

## 2018-12-20 ENCOUNTER — OFFICE VISIT (OUTPATIENT)
Dept: PHYSICAL THERAPY | Age: 73
End: 2018-12-20
Attending: FAMILY MEDICINE
Payer: MEDICARE

## 2018-12-20 PROCEDURE — 97140 MANUAL THERAPY 1/> REGIONS: CPT

## 2018-12-20 PROCEDURE — 97110 THERAPEUTIC EXERCISES: CPT

## 2018-12-20 NOTE — PROGRESS NOTES
Dx: right shoulder sub acromial impingement with rotator cuff dysfunction            Authorized # of Visits:  15-18 requested          Next MD visit: none scheduled  Fall Risk: standard         Precautions: n/a           Subjective: of the last two nights UBE x 6 min level 1 UBE x 6 min level 3    Prone shoulder extension off the edge, 10 x with scap assist  Flexion small ROM with scap assist 10x  Prone shoulder extension off the edge, 10 x with scap assist  Flexion small ROM with scap assist 10x  Prone carroll GTB both 15 x 2    Seated 45 deg   ER 2 # and IR with RTB    15 x 2  ER and IR standing GTB both 15 x 2    Seated 45 deg   ER 2 # and IR with RTB    15 x 2  ER and IR standing GTB both 15 x 2    Seated 45 deg   ER 2 # and IR with RTB    15 x 2 Seated 45 de scaps x 10 x 2     Plank  With stabilization and forward arm taps 10 x 2 both arms  Plank with lateral taps and serratus 10 x 2    Skilled Services: manual therapy to include interventions to address impairments, specific exercises selected based on impair

## 2019-01-03 ENCOUNTER — TELEPHONE (OUTPATIENT)
Dept: FAMILY MEDICINE CLINIC | Facility: CLINIC | Age: 74
End: 2019-01-03

## 2019-01-03 ENCOUNTER — OFFICE VISIT (OUTPATIENT)
Dept: PHYSICAL THERAPY | Age: 74
End: 2019-01-03
Attending: FAMILY MEDICINE
Payer: MEDICARE

## 2019-01-03 DIAGNOSIS — M25.511 ACUTE PAIN OF RIGHT SHOULDER: Primary | ICD-10-CM

## 2019-01-03 DIAGNOSIS — M67.911 ROTATOR CUFF DISORDER, RIGHT: ICD-10-CM

## 2019-01-03 PROCEDURE — 97110 THERAPEUTIC EXERCISES: CPT

## 2019-01-03 NOTE — TELEPHONE ENCOUNTER
Needs to be re-evaluated, MRI is very pricey test and must be ordered by physician at visit or preferably by a specialist. Please have him book appointment with

## 2019-01-03 NOTE — PROGRESS NOTES
Progress Summary    Pt has attended 15, cancelled 0, and no shown 0 visits in Physical Therapy.      Subjective: States he was making great progress - was independent with his routine for a week and half until he felt pain with an exercise he had been doin hair  · Pt will improve shoulder strength to 5-/5 to improve function with ADLs such as carrying a bag of groceries   · Pt will demonstrate increased mid/low trap strength to 5/5 to promote improved shoulder mechanics and stabilization with ADLs such as li 10x    AC joint mob 10 x 2 G III  AC joint mob 10 x 2 G III AC joint mob 10 x 2 G III      Supine D2 Flexion and extension  YTB 10 x 2  Supine D2 Flexion and extension  YTB 10 x 2  Supine D2 and D1 Flexion and extension  YTB 10 x 2  Supine D2 and D1 Flexio deg   ER 2 # and IR with RTB    15 x 2  ER and IR standing GTB both 15 x 2    Seated 45 deg   ER 2 # and IR with RTB    15 x 2 Seated 45 deg   ER 3 # and IR with GTB    15 x 2 Seated 45 deg   ER 3 # and IR with GTB    15 x 2 Seated 45 deg   ER 3 # and IR w arms  Plank with lateral taps and serratus 10 x 2    Skilled Services: manual therapy to include interventions to address impairments, specific exercises selected based on impairments to improve ROM, strength and/or motor control; education,  Demonstration

## 2019-01-03 NOTE — TELEPHONE ENCOUNTER
Pt calling to ask if he can get an order for MRI. He has his last PT visit for his shoulder today and is still having pain. Please advise, thank you.

## 2019-01-04 NOTE — TELEPHONE ENCOUNTER
, Patient wants MRI without being seen, or can we expedite appointment with you? Next avail. 1/18/19-or order MRI?  You saw him before PT

## 2019-01-04 NOTE — TELEPHONE ENCOUNTER
Spoke to pt and offered pt first available with  (1/18/19) and pt stated that he would like to talk to  because the shoulder pain has been going on for years and he wants it taken care of soon.  Explained to pt that a nurse would call him back today

## 2019-01-07 ENCOUNTER — HOSPITAL ENCOUNTER (OUTPATIENT)
Dept: MRI IMAGING | Facility: HOSPITAL | Age: 74
Discharge: HOME | End: 2019-01-07
Attending: FAMILY MEDICINE
Payer: MEDICARE

## 2019-01-07 DIAGNOSIS — M25.511 ACUTE PAIN OF RIGHT SHOULDER: ICD-10-CM

## 2019-01-07 DIAGNOSIS — M67.911 ROTATOR CUFF DISORDER, RIGHT: ICD-10-CM

## 2019-01-07 PROCEDURE — 73221 MRI JOINT UPR EXTREM W/O DYE: CPT | Performed by: FAMILY MEDICINE

## 2019-01-08 ENCOUNTER — TELEPHONE (OUTPATIENT)
Dept: FAMILY MEDICINE CLINIC | Facility: CLINIC | Age: 74
End: 2019-01-08

## 2019-01-08 NOTE — TELEPHONE ENCOUNTER
PLEASE CALL FOR FOLLOW UP APPT         From: Tolu Zepeda MD   Sent: 1/3/2019   6:28 PM   To: [facility] Clinical Staff     Does the patient has a follow-up appointment with me?   ----- Message -----   From: Asiya Wayne PT   Sent: 1/3/2019  12:28

## 2019-01-09 PROBLEM — M19.011 ARTHROSIS OF RIGHT ACROMIOCLAVICULAR JOINT: Status: ACTIVE | Noted: 2019-01-09

## 2019-01-09 PROBLEM — M75.101 ROTATOR CUFF SYNDROME OF RIGHT SHOULDER: Status: ACTIVE | Noted: 2019-01-09

## 2019-01-22 ENCOUNTER — OFFICE VISIT (OUTPATIENT)
Dept: PHYSICAL THERAPY | Age: 74
End: 2019-01-22
Attending: FAMILY MEDICINE
Payer: MEDICARE

## 2019-01-22 ENCOUNTER — OFFICE VISIT (OUTPATIENT)
Dept: FAMILY MEDICINE CLINIC | Facility: CLINIC | Age: 74
End: 2019-01-22
Payer: MEDICARE

## 2019-01-22 VITALS
WEIGHT: 187 LBS | HEIGHT: 73 IN | SYSTOLIC BLOOD PRESSURE: 120 MMHG | RESPIRATION RATE: 12 BRPM | HEART RATE: 68 BPM | BODY MASS INDEX: 24.78 KG/M2 | TEMPERATURE: 98 F | DIASTOLIC BLOOD PRESSURE: 68 MMHG

## 2019-01-22 DIAGNOSIS — M25.511 RIGHT SHOULDER PAIN, UNSPECIFIED CHRONICITY: Primary | ICD-10-CM

## 2019-01-22 PROCEDURE — 99213 OFFICE O/P EST LOW 20 MIN: CPT | Performed by: FAMILY MEDICINE

## 2019-01-22 PROCEDURE — 97110 THERAPEUTIC EXERCISES: CPT

## 2019-01-22 PROCEDURE — 97140 MANUAL THERAPY 1/> REGIONS: CPT

## 2019-01-22 NOTE — PROGRESS NOTES
Lily Becerra is a 74year old male. CHIEF COMPLAINT   Right shoulder pain  HPI:   Right shoulder pain since the fall. Took NSAID, then did PT, then went to Dr. Vi Grant - had injection which seemed to help but only feels about 60% better.   Restart distress  SKIN: no rashes,no suspicious lesions  LUNGS: clear to auscultation  CARDIO: RRR without murmu  EXTREMITIES: Right shoulder without swelling or deformity. Decreased ROM with internal rotation.   Decreased strength with lift off test.  Pain with a

## 2019-01-22 NOTE — PROGRESS NOTES
Dx: right shoulder sub acromial impingement with rotator cuff dysfunction            Authorized # of Visits:  Updated - after reassessment 8 more visits         Next MD visit: none scheduled  Fall Risk: standard         Precautions: n/a           Humberto report improved ability to sleep without waking >1x/night due to shoulder pain   · Pt will improve shoulder flexion AROM to >160 degrees to be able to reach into overhead cabinets without pain or restriction  MET 12/20/2018   · Pt will improve shoulder abd 45 degrees abduction x 10 sec hold x 15 reps  SL posterior caspule stretch manual block to scapula hold 10 sec x 10 reps   Reach cross body re check   X 2 bouts     Supine serratus 7# 15 x 2  Supine serratus 8 # 15 x 2  Supine serratus 9 # 15 x 2  Supine s 2 Plank with scap retraction sustained 30 sec x 2    Push up plus of scaps x 10 x 2     Plank  With stabilization and forward arm taps 10 x 2 both arms  Plank with lateral taps and serratus 10 x 2  Sleeper stretch 15 sec x 3  Reassess cross body reach

## 2019-01-24 ENCOUNTER — OFFICE VISIT (OUTPATIENT)
Dept: PHYSICAL THERAPY | Age: 74
End: 2019-01-24
Attending: FAMILY MEDICINE
Payer: MEDICARE

## 2019-01-24 PROCEDURE — 97140 MANUAL THERAPY 1/> REGIONS: CPT

## 2019-01-24 PROCEDURE — 97014 ELECTRIC STIMULATION THERAPY: CPT

## 2019-01-24 PROCEDURE — 97110 THERAPEUTIC EXERCISES: CPT

## 2019-01-24 NOTE — PROGRESS NOTES
Dx: right shoulder sub acromial impingement with rotator cuff dysfunction            Authorized # of Visits:  Updated - after reassessment 8 more visits         Next MD visit: none scheduled  Fall Risk: standard         Precautions: n/a           Humberto neuromuscular re-education and modalities as needed.    12/18/2018   TX 13  12/20/2018   TX 14 1/3/2019   TX 15  1/22/2019     After injection  1/8   TX 16 1/24/2019    TX 2/8    TX 17    UBE x 6 min level 1 UBE x 6 min level 3 UBE x 6 min level 3 UBE x 6 m x 2 Serratus wall slides foam roller on the wall 10 x 2 Ice pack x 15 min  Standing row BTB 15 x 2  Standing row BTB 15 x 2     Plank with scap retraction sustained 30 sec x 2    Push up plus of scaps x 10 x 2     Plank  With stabilization and forward arm

## 2019-01-29 ENCOUNTER — OFFICE VISIT (OUTPATIENT)
Dept: PHYSICAL THERAPY | Age: 74
End: 2019-01-29
Attending: FAMILY MEDICINE
Payer: MEDICARE

## 2019-01-29 PROCEDURE — 97110 THERAPEUTIC EXERCISES: CPT

## 2019-01-29 PROCEDURE — 97014 ELECTRIC STIMULATION THERAPY: CPT

## 2019-01-29 PROCEDURE — 97140 MANUAL THERAPY 1/> REGIONS: CPT

## 2019-01-29 NOTE — PROGRESS NOTES
Dx: right shoulder sub acromial impingement with rotator cuff dysfunction            Authorized # of Visits:  Updated - after reassessment 8 more visits         Next MD visit: none scheduled  Fall Risk: standard         Precautions: n/a           Manda Brown 12/20/2018   TX 14 1/3/2019   TX 15  1/22/2019     After injection  1/8   TX 16 1/24/2019    TX 2/8    TX 17 1/29/2019   3/8  TX 18   UBE x 6 min level 1 UBE x 6 min level 3 UBE x 6 min level 3 UBE x 6 min level 3 UBE x 6 min level 3 UBE x 6 min level 3 with posterior glide hold 5-10 seconds x 10 reps    Seated 45 deg   ER 3 # and IR with GTB    15 x 2 Seated 45 deg   ER 3 # and IR with GTB    15 x 2 Wall slides 10 x 2  Wall slides 10 x 2   With band 10 x 2  Wall slides 10 x 2   With band 10 x 2  D1 flexi

## 2019-01-31 ENCOUNTER — OFFICE VISIT (OUTPATIENT)
Dept: PHYSICAL THERAPY | Age: 74
End: 2019-01-31
Attending: FAMILY MEDICINE
Payer: MEDICARE

## 2019-01-31 PROCEDURE — 97140 MANUAL THERAPY 1/> REGIONS: CPT

## 2019-01-31 PROCEDURE — 97110 THERAPEUTIC EXERCISES: CPT

## 2019-01-31 PROCEDURE — 97014 ELECTRIC STIMULATION THERAPY: CPT

## 2019-01-31 NOTE — PROGRESS NOTES
Dx: right shoulder sub acromial impingement with rotator cuff dysfunction            Authorized # of Visits:  Updated - after reassessment 8 more visits         Next MD visit: none scheduled   Fall Risk: standard         Precautions: n/a       Subjective: re-education and modalities as needed.    12/18/2018   TX 13  12/20/2018   TX 14 1/3/2019   TX 15  1/22/2019     After injection  1/8   TX 16 1/24/2019    TX 2/8    TX 17 1/29/2019   3/8  TX 18 1/31/2019   TX 4/8  TX 19   UBE x 6 min level 1 UBE x 6 min lev Resisted IR GgTB 15 x 2   Pain free ROM  Reisted GTB ER 15 x 2   Supine serratus punch with random perturbations 8 x 20 seconds each   with YTKAILASH sustained serratus x 5 reps Supine serratus punch with random perturbations 8 x 20 seconds each   with JEROME osborn injury  Charges:TE x 2 MT x 1  estim unattendedn     Total Timed Treatment: 45 min  Total Treatment Time: 55 min

## 2019-02-01 RX ORDER — ATORVASTATIN CALCIUM 40 MG/1
40 TABLET, FILM COATED ORAL NIGHTLY
Qty: 90 TABLET | Refills: 0 | Status: SHIPPED | OUTPATIENT
Start: 2019-02-01 | End: 2019-05-01

## 2019-02-05 ENCOUNTER — OFFICE VISIT (OUTPATIENT)
Dept: PHYSICAL THERAPY | Age: 74
End: 2019-02-05
Attending: FAMILY MEDICINE
Payer: MEDICARE

## 2019-02-05 PROCEDURE — 97140 MANUAL THERAPY 1/> REGIONS: CPT

## 2019-02-05 PROCEDURE — 97110 THERAPEUTIC EXERCISES: CPT

## 2019-02-05 NOTE — PROGRESS NOTES
Progress Summary    Pt has attended 5 of 8 visits since last progress note in Physical Therapy. Subjective:  Yesterday I felt great, I practiced my golf swing and it is feeling good. The weekend was good as well.  I feel like this are starting to reall improved shoulder mechanics and stabilization with ADLs such as lifting and reaching  MET 2/5/2019     Rehab Potential: good    Plan: Continue skilled Physical Therapy 1 x/week or a total of 4 visits over a 90 day period.  Treatment will include: manual the reassess cross body reach 10 x 3 sets   Inferiror G III 10 x 2   G III posterior mob to the righgt shoulder reassess cross body reach 10 x 3 sets   Inferiror G III 10 x 2   Isometrics to ER in netutral and 45 degrees abduction x 10 sec hold x 15 reps  SL p 10 x 2  D1 flexion 10 x 3 sets with yellow D1 flexion 10 x 3 sets with yellow  opp  D1 Ext 10 x 3 sets with yellow   D1 flexion 10 x 3 sets with yellow  opp  D1 Ext 10 x 3 sets with yellow   Serratus wall slides foam roller on the wall 10 x 2 Serratus wall

## 2019-02-12 ENCOUNTER — OFFICE VISIT (OUTPATIENT)
Dept: PHYSICAL THERAPY | Age: 74
End: 2019-02-12
Attending: FAMILY MEDICINE
Payer: MEDICARE

## 2019-02-12 PROCEDURE — 97140 MANUAL THERAPY 1/> REGIONS: CPT

## 2019-02-12 PROCEDURE — 97110 THERAPEUTIC EXERCISES: CPT

## 2019-02-12 NOTE — PROGRESS NOTES
Dx: right shoulder sub acromial impingement with rotator cuff dysfunction            Authorized # of Visits:  Updated - after reassessment 8 more visits         Next MD visit: none scheduled   Fall Risk: standard         Precautions: n/a       Subjective: exericse, neuro muscular re-education and modalities as needed.         1/3/2019   TX 15  1/22/2019     After injection  1/8   TX 16 1/24/2019    TX 2/8    TX 17 1/29/2019   3/8  TX 18 1/31/2019   TX 4/8  TX 19 2/5/2019   TX 5/8  TX 20  Progress note 2/12/2 Resisted IR GgTB 15 x 2   Pain free ROM  Reisted GTB ER 15 x 2    No resistance serratus punch 10 x 2    With mulligan belt cross body  Stretch with posterior glide hold 5-10 seconds x 10 reps  With mulligan belt cross body  Stretch with posterior glide ho

## 2019-02-14 ENCOUNTER — APPOINTMENT (OUTPATIENT)
Dept: PHYSICAL THERAPY | Age: 74
End: 2019-02-14
Attending: FAMILY MEDICINE
Payer: MEDICARE

## 2019-02-19 ENCOUNTER — OFFICE VISIT (OUTPATIENT)
Dept: PHYSICAL THERAPY | Age: 74
End: 2019-02-19
Attending: FAMILY MEDICINE
Payer: MEDICARE

## 2019-02-19 PROCEDURE — 97110 THERAPEUTIC EXERCISES: CPT

## 2019-02-19 PROCEDURE — 97014 ELECTRIC STIMULATION THERAPY: CPT

## 2019-02-19 PROCEDURE — 97140 MANUAL THERAPY 1/> REGIONS: CPT

## 2019-02-19 NOTE — PROGRESS NOTES
Dx: right shoulder sub acromial impingement with rotator cuff dysfunction            Authorized # of Visits:  Updated - after reassessment 8 more visits         Next MD visit: none scheduled   Fall Risk: standard         Precautions: n/a       Subjective: skilled Physical Therapy 1 x/week or a total of 4 visits over a 90 day period. Treatment will include: manual therapy, therapeutic exericse, neuro muscular re-education and modalities as needed.         1/3/2019   TX 15  1/22/2019     After injection  1/8 hold 10 sec x 10 reps   Reach cross body re check   X 2 bouts   Lateral step out with isometric IR - Resisted IR YTB 15 x 2   Pain free ROM  Reisted YTB ER 15 x 2  Resisted IR YTB 15 x 2   Pain free ROM  Reisted YTB ER 15 x 2 Resisted IR GgTB 15 x 2   Pain min    Open book - 10 x 2 sets each side  Trunk rotation sitting 10 x 2 both directions Sleeper stretch 15 sec x 3  Reassess cross body reach   estim trial x 10 min    Open book - 10 x 2 sets each side  Trunk rotation sitting 10 x 2   Skilled Services: man

## 2019-02-26 ENCOUNTER — OFFICE VISIT (OUTPATIENT)
Dept: PHYSICAL THERAPY | Age: 74
End: 2019-02-26
Attending: FAMILY MEDICINE
Payer: MEDICARE

## 2019-02-26 PROCEDURE — 97140 MANUAL THERAPY 1/> REGIONS: CPT

## 2019-02-26 PROCEDURE — 97110 THERAPEUTIC EXERCISES: CPT

## 2019-02-26 PROCEDURE — 97014 ELECTRIC STIMULATION THERAPY: CPT

## 2019-02-26 NOTE — PROGRESS NOTES
Dx: right shoulder sub acromial impingement with rotator cuff dysfunction            Authorized # of Visits:  Updated - after reassessment 8 more visits         Next MD visit: none scheduled   Fall Risk: standard         Precautions: n/a       Subjective: TX 23   UBE x 6 min level 3 UBE x 6 min level 3 UBE x 6 min level 3 UBE x 6 min level 3 UBE x 6 min level 3 UBE x 6 min level 3 UBE x 6 min level 3 UBE x 6 min level 3   Reassessment of should x 8 min  Shoulder ROM Shoulder ROM Shoulder ROM Shoulder ROM ROM  Reisted GTB ER 15 x 2        With mulligan belt cross body  Stretch with posterior glide hold 5-10 seconds x 10 reps  With mulligan belt cross body  Stretch with posterior glide hold 5-10 seconds x 10 reps  With mulligan belt cross body  Stretch with cross body reach   estim trial x 10 min  Open book - 10 x 2 sets each side  Trunk rotation sitting 10 x 2   Skilled Services: manual therapy to include interventions to address impairments, specific exercises selected based on impairments to improve ROM, s

## 2019-03-06 ENCOUNTER — OFFICE VISIT (OUTPATIENT)
Dept: PHYSICAL THERAPY | Age: 74
End: 2019-03-06
Attending: FAMILY MEDICINE
Payer: MEDICARE

## 2019-03-06 PROCEDURE — 97110 THERAPEUTIC EXERCISES: CPT

## 2019-03-06 PROCEDURE — 97014 ELECTRIC STIMULATION THERAPY: CPT

## 2019-03-06 PROCEDURE — 97140 MANUAL THERAPY 1/> REGIONS: CPT

## 2019-03-06 NOTE — PROGRESS NOTES
Discharge Summary    Pt has attended 24, cancelled 2, and no shown 1 visits in Physical Therapy. Subjective: Patient states the pain is still there, but he is seeing a general trend towards improvement.  He is second guessing his surgery and would like reaching  MET 2/5/2019     Plan: DC with HEP      Patient/Family/Caregiver was advised of these findings, precautions, and treatment options and has agreed to actively participate in planning and for this course of care.     Thank you for your referral. If check   X 2 bouts SL posterior caspule stretch manual block to scapula hold 10 sec x 10 reps   Reach cross body re check   X 2 bouts SL posterior caspule stretch manual block to scapula hold 10 sec x 10 reps   Reach cross body re check   X 2 bouts SL poste yellow   D1 flexion 10 x 3 sets with yellow  opp  D1 Ext 10 x 3 sets with yellow D1 flexion 10 x 3 sets with red  opp  D1 Ext 10 x 3 sets with red D1 flexion 10 x 3 sets with red  opp  D1 Ext 10 x 3 sets with red D1 flexion 10 x 3 sets with red  opp  D1 Ex

## 2019-05-02 RX ORDER — ATORVASTATIN CALCIUM 40 MG/1
TABLET, FILM COATED ORAL
Qty: 90 TABLET | Refills: 0 | Status: SHIPPED | OUTPATIENT
Start: 2019-05-02 | End: 2019-07-28

## 2019-05-10 ENCOUNTER — OFFICE VISIT (OUTPATIENT)
Dept: FAMILY MEDICINE CLINIC | Facility: CLINIC | Age: 74
End: 2019-05-10
Payer: MEDICARE

## 2019-05-10 VITALS
BODY MASS INDEX: 24 KG/M2 | RESPIRATION RATE: 12 BRPM | HEART RATE: 76 BPM | SYSTOLIC BLOOD PRESSURE: 122 MMHG | DIASTOLIC BLOOD PRESSURE: 72 MMHG | TEMPERATURE: 98 F | WEIGHT: 183 LBS

## 2019-05-10 DIAGNOSIS — R35.0 URINARY FREQUENCY: ICD-10-CM

## 2019-05-10 DIAGNOSIS — R82.90 ABNORMAL URINE: ICD-10-CM

## 2019-05-10 DIAGNOSIS — R10.30 LOWER ABDOMINAL PAIN: Primary | ICD-10-CM

## 2019-05-10 PROCEDURE — 81003 URINALYSIS AUTO W/O SCOPE: CPT | Performed by: FAMILY MEDICINE

## 2019-05-10 PROCEDURE — 87086 URINE CULTURE/COLONY COUNT: CPT | Performed by: FAMILY MEDICINE

## 2019-05-10 PROCEDURE — 99213 OFFICE O/P EST LOW 20 MIN: CPT | Performed by: FAMILY MEDICINE

## 2019-05-10 NOTE — PROGRESS NOTES
Regina Camarena is a 74year old male. HPI:   Patient is a 40-year-old male here with approximately 8 to 9 days of lower abdominal discomfort. He denies any changes with his bowel movements.   He says his urine has been normal with the exception of poss Temp 98.2 °F (36.8 °C)   Resp 12   Wt 183 lb   BMI 23.50 kg/m²   GENERAL: well developed, well nourished,in no apparent distress  SKIN: no rashes,no suspicious lesions  BACK: No pain to Mena's punch bilaterally   GI: No masses or hepatosplenomegaly noted

## 2019-05-30 ENCOUNTER — OFFICE VISIT (OUTPATIENT)
Dept: FAMILY MEDICINE CLINIC | Facility: CLINIC | Age: 74
End: 2019-05-30
Payer: MEDICARE

## 2019-05-30 VITALS
HEART RATE: 72 BPM | HEIGHT: 74 IN | TEMPERATURE: 98 F | WEIGHT: 182 LBS | BODY MASS INDEX: 23.36 KG/M2 | SYSTOLIC BLOOD PRESSURE: 114 MMHG | RESPIRATION RATE: 14 BRPM | DIASTOLIC BLOOD PRESSURE: 70 MMHG

## 2019-05-30 DIAGNOSIS — Z12.5 SCREENING FOR PROSTATE CANCER: ICD-10-CM

## 2019-05-30 DIAGNOSIS — E04.2 MULTINODULAR THYROID: ICD-10-CM

## 2019-05-30 DIAGNOSIS — Z13.29 SCREENING FOR ENDOCRINE, NUTRITIONAL, METABOLIC AND IMMUNITY DISORDER: ICD-10-CM

## 2019-05-30 DIAGNOSIS — E78.00 PURE HYPERCHOLESTEROLEMIA: ICD-10-CM

## 2019-05-30 DIAGNOSIS — Z13.228 SCREENING FOR ENDOCRINE, NUTRITIONAL, METABOLIC AND IMMUNITY DISORDER: ICD-10-CM

## 2019-05-30 DIAGNOSIS — Z00.00 ENCOUNTER FOR ANNUAL HEALTH EXAMINATION: Primary | ICD-10-CM

## 2019-05-30 DIAGNOSIS — Z13.0 SCREENING FOR ENDOCRINE, NUTRITIONAL, METABOLIC AND IMMUNITY DISORDER: ICD-10-CM

## 2019-05-30 DIAGNOSIS — Z13.21 SCREENING FOR ENDOCRINE, NUTRITIONAL, METABOLIC AND IMMUNITY DISORDER: ICD-10-CM

## 2019-05-30 DIAGNOSIS — Z13.220 ENCOUNTER FOR SCREENING FOR LIPOID DISORDERS: ICD-10-CM

## 2019-05-30 PROCEDURE — 99213 OFFICE O/P EST LOW 20 MIN: CPT | Performed by: FAMILY MEDICINE

## 2019-05-30 PROCEDURE — G0439 PPPS, SUBSEQ VISIT: HCPCS | Performed by: FAMILY MEDICINE

## 2019-05-30 NOTE — PROGRESS NOTES
HPI:   Jules Wells is a 74year old male who presents for a Medicare Subsequent Annual Wellness visit (Pt already had Initial Annual Wellness). Julse Wells is a 74year old male who presents for recheck of hyperlipidemia.  Patient reports t mouth daily. MEDICAL INFORMATION:   He  has a past medical history of Cancer of skin, face, Chest pain, High cholesterol, Thyroid nodule, and Visual impairment.     He  has a past surgical history that includes tonsillectomy (6208); hernia surgery (199 thyroid: not enlarged, symmetric, no tenderness/mass/nodules, no carotid bruit or JVD   Lungs:   Clear to auscultation bilaterally, respirations unlabored   Chest Wall:  No tenderness or deformity   Heart:  Regular rate and rhythm, S1, S2 normal, no murmur issues and agrees to the plan. No follow-ups on file.      Adilene Aranda MD, 5/30/2019      General Health     In the past six months, have you lost more than 10 pounds without trying?: (P) 2 - No    Has your appetite been poor?: (P) No         How pleasure in doing things (over the last two weeks)?: Not at all    Feeling down, depressed, or hopeless (over the last two weeks)?: Not at all    PHQ-2 SCORE: 0         Advance Directives     Do you have a healthcare power of ?: (P) No    Do you ha Screening      PSA  Annually PSA due on 06/03/2021  Update Health Maintenance if applicable   Immunizations      Influenza No orders found for this or any previous visit.  Update Immunization Activity if applicable    Pneumoccocal 13 (Prevnar) No orders fou

## 2019-05-30 NOTE — PATIENT INSTRUCTIONS
Hever Curtis's SCREENING SCHEDULE   Tests on this list are recommended by your physician but may not be covered, or covered at this frequency, by your insurer. Please check with your insurance carrier before scheduling to verify coverage.     PREVENT Abdominal aortic aneurysm screening (once between ages 73-68)  No results found for this or any previous visit.  Limited to patients who meet one of the following criteria:   • Men who are 73-68 years old and have smoked more than 100 cigarettes in their li Clients of institutions for the mentally retarded   Persons who live in the same house as a HepB virus carrier   Homosexual men   Illicit injectable drug abusers     Tetanus Toxoid- Only covered with a cut with metal- TD and TDaP Not covered by [facility]

## 2019-06-03 ENCOUNTER — LAB ENCOUNTER (OUTPATIENT)
Dept: LAB | Age: 74
End: 2019-06-03
Attending: FAMILY MEDICINE
Payer: MEDICARE

## 2019-06-03 DIAGNOSIS — Z13.21 SCREENING FOR ENDOCRINE, NUTRITIONAL, METABOLIC AND IMMUNITY DISORDER: ICD-10-CM

## 2019-06-03 DIAGNOSIS — Z12.5 SCREENING FOR PROSTATE CANCER: ICD-10-CM

## 2019-06-03 DIAGNOSIS — E78.00 PURE HYPERCHOLESTEROLEMIA: ICD-10-CM

## 2019-06-03 DIAGNOSIS — Z13.29 SCREENING FOR ENDOCRINE, NUTRITIONAL, METABOLIC AND IMMUNITY DISORDER: ICD-10-CM

## 2019-06-03 DIAGNOSIS — Z13.0 SCREENING FOR ENDOCRINE, NUTRITIONAL, METABOLIC AND IMMUNITY DISORDER: ICD-10-CM

## 2019-06-03 DIAGNOSIS — Z13.228 SCREENING FOR ENDOCRINE, NUTRITIONAL, METABOLIC AND IMMUNITY DISORDER: ICD-10-CM

## 2019-06-03 PROCEDURE — 36415 COLL VENOUS BLD VENIPUNCTURE: CPT

## 2019-06-03 PROCEDURE — 80053 COMPREHEN METABOLIC PANEL: CPT

## 2019-06-03 PROCEDURE — 80061 LIPID PANEL: CPT

## 2019-06-03 PROCEDURE — 85025 COMPLETE CBC W/AUTO DIFF WBC: CPT

## 2019-06-26 ENCOUNTER — MED REC SCAN ONLY (OUTPATIENT)
Dept: FAMILY MEDICINE CLINIC | Facility: CLINIC | Age: 74
End: 2019-06-26

## 2019-07-29 RX ORDER — ATORVASTATIN CALCIUM 40 MG/1
TABLET, FILM COATED ORAL
Qty: 90 TABLET | Refills: 1 | Status: SHIPPED | OUTPATIENT
Start: 2019-07-29 | End: 2020-01-30

## 2019-08-05 ENCOUNTER — HOSPITAL ENCOUNTER (OUTPATIENT)
Dept: GENERAL RADIOLOGY | Age: 74
Discharge: HOME | End: 2019-08-05
Attending: FAMILY MEDICINE
Payer: MEDICARE

## 2019-08-05 ENCOUNTER — OFFICE VISIT (OUTPATIENT)
Dept: FAMILY MEDICINE CLINIC | Facility: CLINIC | Age: 74
End: 2019-08-05
Payer: MEDICARE

## 2019-08-05 VITALS
HEART RATE: 76 BPM | HEIGHT: 74 IN | RESPIRATION RATE: 16 BRPM | WEIGHT: 180 LBS | BODY MASS INDEX: 23.1 KG/M2 | SYSTOLIC BLOOD PRESSURE: 98 MMHG | TEMPERATURE: 98 F | DIASTOLIC BLOOD PRESSURE: 60 MMHG

## 2019-08-05 DIAGNOSIS — R10.32 CHRONIC GROIN PAIN, LEFT: ICD-10-CM

## 2019-08-05 DIAGNOSIS — R10.32 CHRONIC GROIN PAIN, LEFT: Primary | ICD-10-CM

## 2019-08-05 DIAGNOSIS — G89.29 CHRONIC GROIN PAIN, LEFT: Primary | ICD-10-CM

## 2019-08-05 DIAGNOSIS — G89.29 CHRONIC GROIN PAIN, LEFT: ICD-10-CM

## 2019-08-05 PROCEDURE — 73502 X-RAY EXAM HIP UNI 2-3 VIEWS: CPT | Performed by: FAMILY MEDICINE

## 2019-08-05 PROCEDURE — 99213 OFFICE O/P EST LOW 20 MIN: CPT | Performed by: FAMILY MEDICINE

## 2019-08-05 NOTE — PROGRESS NOTES
Left groin pain    Has noted left groin pain for 6 weeks. No dysuria. No injury. Was golfing this weekend, actually felt better. Left hip feels stiffer in the morning. Other joints seem okay. No change in urinary or bowel habits.   BP 98/60 (BP Location

## 2019-08-06 ENCOUNTER — TELEPHONE (OUTPATIENT)
Dept: FAMILY MEDICINE CLINIC | Facility: CLINIC | Age: 74
End: 2019-08-06

## 2019-08-06 DIAGNOSIS — M16.0 OSTEOARTHRITIS OF BOTH HIPS, UNSPECIFIED OSTEOARTHRITIS TYPE: ICD-10-CM

## 2019-08-06 DIAGNOSIS — M25.552 LEFT HIP PAIN: Primary | ICD-10-CM

## 2019-08-14 ENCOUNTER — OFFICE VISIT (OUTPATIENT)
Dept: PHYSICAL THERAPY | Age: 74
End: 2019-08-14
Attending: FAMILY MEDICINE
Payer: MEDICARE

## 2019-08-14 DIAGNOSIS — M16.0 OSTEOARTHRITIS OF BOTH HIPS, UNSPECIFIED OSTEOARTHRITIS TYPE: ICD-10-CM

## 2019-08-14 DIAGNOSIS — M25.552 LEFT HIP PAIN: ICD-10-CM

## 2019-08-14 PROCEDURE — 97161 PT EVAL LOW COMPLEX 20 MIN: CPT

## 2019-08-14 PROCEDURE — 97110 THERAPEUTIC EXERCISES: CPT

## 2019-08-15 NOTE — PROGRESS NOTES
LOWER EXTREMITY EVALUATION:   Referring Physician: Dr. Radha Nunez  Diagnosis: L hip OA - L groin pain    Date of Service: 8/14/2019     PATIENT SUMMARY   Akila Carter is a 74year old male who presents to therapy today with complaints of  L hip pain evaluation findings, pathology, POC and HEP. Pt voiced understanding and performs HEP correctly without reported pain. Skilled Physical Therapy is medically necessary to address the above impairments and reach functional goals.      Precautions:  None   OB stretch and piriformis stretch issued.  Flossing HS on the L today    Charges: PT Eval low , EX 1      Total Timed Treatment:  45 min     Total Treatment Time:45 min     Based on clinical rationale and outcome measures, this evaluation involved low  decisio

## 2019-08-16 ENCOUNTER — APPOINTMENT (OUTPATIENT)
Dept: PHYSICAL THERAPY | Age: 74
End: 2019-08-16
Attending: FAMILY MEDICINE
Payer: MEDICARE

## 2019-08-20 ENCOUNTER — OFFICE VISIT (OUTPATIENT)
Dept: PHYSICAL THERAPY | Age: 74
End: 2019-08-20
Attending: FAMILY MEDICINE
Payer: MEDICARE

## 2019-08-20 PROCEDURE — 97110 THERAPEUTIC EXERCISES: CPT

## 2019-08-20 NOTE — PROGRESS NOTES
Dx:  L hip OA pain      Insurance (Authorized # of Visits):   15          Authorizing Physician: Dr. Brooke Bustos  Next MD visit: none scheduled  Fall Risk: standard         Precautions: n/a             Subjective:  3/10 this am. Better with movement.   Still

## 2019-08-22 ENCOUNTER — OFFICE VISIT (OUTPATIENT)
Dept: PHYSICAL THERAPY | Age: 74
End: 2019-08-22
Attending: FAMILY MEDICINE
Payer: MEDICARE

## 2019-08-22 PROCEDURE — 97110 THERAPEUTIC EXERCISES: CPT

## 2019-08-22 NOTE — PROGRESS NOTES
Dx:  L hip OA pain      Insurance (Authorized # of Visits):   15          Authorizing Physician: Dr. Karon Flores  Next MD visit: none scheduled  Fall Risk: standard         Precautions: n/a             Subjective:   2-3/10 in the L hip this am. Took a few ho Treatment Time: 45 min

## 2019-08-30 ENCOUNTER — OFFICE VISIT (OUTPATIENT)
Dept: PHYSICAL THERAPY | Age: 74
End: 2019-08-30
Attending: FAMILY MEDICINE
Payer: MEDICARE

## 2019-08-30 PROCEDURE — 97110 THERAPEUTIC EXERCISES: CPT

## 2019-08-30 NOTE — PROGRESS NOTES
Dx:  L hip OA pain      Insurance (Authorized # of Visits):   15          Authorizing Physician: Dr. Mir Farmer MD visit: none scheduled  Fall Risk: standard         Precautions: n/a             Subjective:    No pain first thing in the am or with bed distraction 3 x 30 secs L only  [place] stretch off the edge 20  secs x 3       HEP: clams, lateral side walking and flex/ext hips with green band     Charges:  EX 3        Total Timed Treatment: 45 min  Total Treatment Time: 45 min

## 2019-09-04 ENCOUNTER — OFFICE VISIT (OUTPATIENT)
Dept: PHYSICAL THERAPY | Age: 74
End: 2019-09-04
Attending: FAMILY MEDICINE
Payer: MEDICARE

## 2019-09-04 PROCEDURE — 97110 THERAPEUTIC EXERCISES: CPT

## 2019-09-04 NOTE — PROGRESS NOTES
Dx:  L hip OA pain      Insurance (Authorized # of Visits):   15          Authorizing Physician: Dr. Mir Scott  Next MD visit: none scheduled  Fall Risk: standard         Precautions: n/a             Subjective:   No pain today for the first time.   Not abiel secs B   Monster walk with green band 10 x 2   Mc dane belt with AROM 10 x 2 L       PA glides lumbar spine central mult bouts  PA glides in the lumbar spine mult bouts grade 2-3       Long axis distraction 3 x 30 secs L only  [place] stretch off the edge

## 2019-09-06 ENCOUNTER — OFFICE VISIT (OUTPATIENT)
Dept: PHYSICAL THERAPY | Age: 74
End: 2019-09-06
Attending: FAMILY MEDICINE
Payer: MEDICARE

## 2019-09-06 PROCEDURE — 97110 THERAPEUTIC EXERCISES: CPT

## 2019-09-08 NOTE — PROGRESS NOTES
Dx:  L hip OA pain      Insurance (Authorized # of Visits):   15          Authorizing Physician: Dr. Mundo Lopez  Next MD visit: none scheduled  Fall Risk: standard         Precautions: n/a             Subjective:    2-3 in the mid am. Not waking up with roxanne plinth  10 x 2   PROM B hips HS stretch   [place] stretch 3 x  30 secs B   Monster walk with green band 10 x 2   [place] belt with AROM 10 x 2 L    Hip flex and ext green band with one hand     Standing walk out with green band     PROM L hip all planes

## 2019-09-24 ENCOUNTER — OFFICE VISIT (OUTPATIENT)
Dept: PHYSICAL THERAPY | Age: 74
End: 2019-09-24
Attending: FAMILY MEDICINE
Payer: MEDICARE

## 2019-09-24 PROCEDURE — 97110 THERAPEUTIC EXERCISES: CPT

## 2019-09-24 NOTE — PROGRESS NOTES
Dx:  L hip OA pain      Insurance (Authorized # of Visits):   15          Authorizing Physician: Dr. Hazel Ly  Next MD visit: none scheduled  Fall Risk: standard         Precautions: n/a            Discharge Summary  Pt has attended 7 visits in Physical T stand 10 x off plinth   Bridge off plinth  10 x 2   PROM B hips HS stretch   [place] stretch 3 x  30 secs B   Monster walk with green band 10 x 2   Mc dane belt with AROM 10 x 2 L    Hip flex and ext green band with one hand     Standing walk out with gre

## 2019-10-01 ENCOUNTER — APPOINTMENT (OUTPATIENT)
Dept: PHYSICAL THERAPY | Age: 74
End: 2019-10-01
Attending: FAMILY MEDICINE
Payer: MEDICARE

## 2019-10-02 ENCOUNTER — APPOINTMENT (OUTPATIENT)
Dept: PHYSICAL THERAPY | Age: 74
End: 2019-10-02
Attending: FAMILY MEDICINE
Payer: MEDICARE

## 2019-11-04 ENCOUNTER — TELEPHONE (OUTPATIENT)
Dept: FAMILY MEDICINE CLINIC | Facility: CLINIC | Age: 74
End: 2019-11-04

## 2019-11-04 DIAGNOSIS — R29.818 OTHER SYMPTOMS AND SIGNS INVOLVING THE NERVOUS SYSTEM: ICD-10-CM

## 2019-11-04 DIAGNOSIS — R51.9 NEW ONSET HEADACHE: Primary | ICD-10-CM

## 2019-11-04 NOTE — TELEPHONE ENCOUNTER
Call transferred live to triage. Pt report as noted below-2 month hx of occasional (every several days) sharp fleeting pain (rates as 8/10) above left brow-lasts only 8-10 seconds then resolves spontaneously.   Confirms frequency and severity have not garcia

## 2019-11-04 NOTE — TELEPHONE ENCOUNTER
Call back from pt-per dr Suzie Fajardo, follow up appt sched for 11/13/19 1030 am  **due to mult order options, see pended MRI/MRA brain order to confirm this is desired imaging-thanks!

## 2019-11-04 NOTE — TELEPHONE ENCOUNTER
1. What are your symptoms? Sharp pain over l eye     2. How long have you been having these symptoms? 2 mo    3. Have you done anything already to treat your symptoms?          ADDITIONAL INFO:

## 2019-11-05 ENCOUNTER — TELEPHONE (OUTPATIENT)
Dept: FAMILY MEDICINE CLINIC | Facility: CLINIC | Age: 74
End: 2019-11-05

## 2019-11-05 DIAGNOSIS — R51.9 NEW ONSET HEADACHE: ICD-10-CM

## 2019-11-05 DIAGNOSIS — R29.818 OTHER SYMPTOMS AND SIGNS INVOLVING THE NERVOUS SYSTEM: Primary | ICD-10-CM

## 2019-11-05 NOTE — TELEPHONE ENCOUNTER
MRI [facility]     Clarifying order    If needing MRI with contrast   And MRA  Order should be--    MRI of brain with and w/o  MRA of the brain     Order pended   Thank you

## 2019-11-06 ENCOUNTER — HOSPITAL ENCOUNTER (OUTPATIENT)
Dept: MRI IMAGING | Age: 74
Discharge: HOME | End: 2019-11-06
Attending: FAMILY MEDICINE
Payer: MEDICARE

## 2019-11-06 DIAGNOSIS — R29.818 OTHER SYMPTOMS AND SIGNS INVOLVING THE NERVOUS SYSTEM: ICD-10-CM

## 2019-11-06 DIAGNOSIS — R51.9 NEW ONSET HEADACHE: ICD-10-CM

## 2019-11-06 PROCEDURE — A9575 INJ GADOTERATE MEGLUMI 0.1ML: HCPCS | Performed by: FAMILY MEDICINE

## 2019-11-06 PROCEDURE — 70544 MR ANGIOGRAPHY HEAD W/O DYE: CPT | Performed by: FAMILY MEDICINE

## 2019-11-06 PROCEDURE — 70553 MRI BRAIN STEM W/O & W/DYE: CPT | Performed by: FAMILY MEDICINE

## 2019-11-13 ENCOUNTER — OFFICE VISIT (OUTPATIENT)
Dept: FAMILY MEDICINE CLINIC | Facility: CLINIC | Age: 74
End: 2019-11-13
Payer: MEDICARE

## 2019-11-13 VITALS
BODY MASS INDEX: 22.97 KG/M2 | TEMPERATURE: 98 F | DIASTOLIC BLOOD PRESSURE: 74 MMHG | SYSTOLIC BLOOD PRESSURE: 120 MMHG | RESPIRATION RATE: 14 BRPM | HEIGHT: 74 IN | WEIGHT: 179 LBS | HEART RATE: 72 BPM

## 2019-11-13 DIAGNOSIS — G50.0 SUPRAORBITAL NEURALGIA: Primary | ICD-10-CM

## 2019-11-13 PROCEDURE — 99214 OFFICE O/P EST MOD 30 MIN: CPT | Performed by: FAMILY MEDICINE

## 2019-11-13 NOTE — PROGRESS NOTES
Shyla Gaytan is a 66year old male. HPI:   Patient is a 20-year-old male with a 2 month hx of occasional (every several days) sharp fleeting pain (rates as 8/10) above his left eyebrow.   He states the pain lasts only 8-10 seconds then resolves sponta distress  SKIN: no rashes,no suspicious lesions  HEENT: atraumatic, normocephalic,ears and throat are clear  NECK: supple,no adenopathy,no bruits  LUNGS: clear to auscultation  CARDIO: RRR without murmur  NEURO: Cranial nerve II through XII are grossly int

## 2019-11-25 NOTE — ED PROVIDER NOTES
Patient Seen in: [facility] Emergency Department    History   Patient presents with:  Chest Pain Angina (cardiovascular)    Stated Complaint: CP    HPI    Patient is a very pleasant 40-year-old male.   Patient arrives for evaluation of a left-sided c (36.8 °C) [12/11/17 1141]  Temp src: Temporal [12/11/17 1141]  SpO2: 97 % [12/11/17 1141]  O2 Device: None (Room air) [12/11/17 1141]    Current:/72   Pulse 70   Temp 98.3 °F (36.8 °C) (Temporal)   Resp 16   Ht 188 cm (6' 2\")   Wt 79.4 kg   SpO2 98% Narrative: The following orders were created for panel order CBC WITH DIFFERENTIAL WITH PLATELET.   Procedure                               Abnormality         Status                     ---------                               -----------         ------ monitor for any acute changes. He will follow-up with his primary care physician. At time of discharge, patient continues to report 1 out of 10 sensation. No changes. He feels well.     Disposition and Plan     Clinical Impression:  Chest pain, non-card Other (Free Text): Patient is here for PDT of the face and ears. We reviewed treatment, expectations and post care instructions. Patient stated he does not have history of HSV or Photosensitive. Patient arrived with appropriate apparel to cover the treated areas and was given samples of gentle products for home use. \\n\\nCleansed with PCA cleanser, degreased with acetone, then applied 1 stick of Levulan. Patient incubated for 2 hours then placed under the light for 16:40. Applied medical barrier cream and spf 50. Follow up 6 weeks. JL Detail Level: Zone Note Text (......Xxx Chief Complaint.): This diagnosis correlates with the

## 2020-01-30 RX ORDER — ATORVASTATIN CALCIUM 40 MG/1
TABLET, FILM COATED ORAL
Qty: 90 TABLET | Refills: 0 | Status: SHIPPED | OUTPATIENT
Start: 2020-01-30 | End: 2020-05-27

## 2020-05-07 ENCOUNTER — PATIENT MESSAGE (OUTPATIENT)
Dept: FAMILY MEDICINE CLINIC | Facility: CLINIC | Age: 75
End: 2020-05-07
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
## 2020-05-27 RX ORDER — ATORVASTATIN CALCIUM 40 MG/1
TABLET, FILM COATED ORAL
Qty: 90 TABLET | Refills: 0 | Status: SHIPPED | OUTPATIENT
Start: 2020-05-27 | End: 2020-08-31

## 2020-07-07 ENCOUNTER — LAB ENCOUNTER (OUTPATIENT)
Dept: LAB | Age: 75
End: 2020-07-07
Attending: FAMILY MEDICINE
Payer: MEDICARE

## 2020-07-07 DIAGNOSIS — Z13.220 ENCOUNTER FOR SCREENING FOR LIPOID DISORDERS: ICD-10-CM

## 2020-07-07 DIAGNOSIS — Z13.89 SCREENING FOR GENITOURINARY CONDITION: ICD-10-CM

## 2020-07-07 DIAGNOSIS — E78.00 PURE HYPERCHOLESTEROLEMIA: ICD-10-CM

## 2020-07-07 DIAGNOSIS — E04.2 MULTINODULAR THYROID: ICD-10-CM

## 2020-07-07 DIAGNOSIS — Z13.0 SCREENING FOR IRON DEFICIENCY ANEMIA: ICD-10-CM

## 2020-07-07 DIAGNOSIS — N40.0 BENIGN PROSTATIC HYPERPLASIA WITHOUT LOWER URINARY TRACT SYMPTOMS: ICD-10-CM

## 2020-07-07 LAB
ALBUMIN SERPL-MCNC: 3.5 G/DL (ref 3.4–5)
ALBUMIN/GLOB SERPL: 1 {RATIO} (ref 1–2)
ALP LIVER SERPL-CCNC: 71 U/L (ref 45–117)
ALT SERPL-CCNC: 33 U/L (ref 16–61)
ANION GAP SERPL CALC-SCNC: 3 MMOL/L (ref 0–18)
AST SERPL-CCNC: 20 U/L (ref 15–37)
BASOPHILS # BLD AUTO: 0.09 X10(3) UL (ref 0–0.2)
BASOPHILS NFR BLD AUTO: 1.7 %
BILIRUB SERPL-MCNC: 0.8 MG/DL (ref 0.1–2)
BILIRUB UR QL STRIP.AUTO: NEGATIVE
BUN BLD-MCNC: 26 MG/DL (ref 7–18)
BUN/CREAT SERPL: 26.3 (ref 10–20)
CALCIUM BLD-MCNC: 8.7 MG/DL (ref 8.5–10.1)
CHLORIDE SERPL-SCNC: 108 MMOL/L (ref 98–112)
CHOLEST SMN-MCNC: 169 MG/DL (ref ?–200)
CLARITY UR REFRACT.AUTO: CLEAR
CO2 SERPL-SCNC: 29 MMOL/L (ref 21–32)
COLOR UR AUTO: YELLOW
CREAT BLD-MCNC: 0.99 MG/DL (ref 0.7–1.3)
DEPRECATED RDW RBC AUTO: 45.6 FL (ref 35.1–46.3)
EOSINOPHIL # BLD AUTO: 0.22 X10(3) UL (ref 0–0.7)
EOSINOPHIL NFR BLD AUTO: 4.1 %
ERYTHROCYTE [DISTWIDTH] IN BLOOD BY AUTOMATED COUNT: 12.7 % (ref 11–15)
GLOBULIN PLAS-MCNC: 3.5 G/DL (ref 2.8–4.4)
GLUCOSE BLD-MCNC: 96 MG/DL (ref 70–99)
GLUCOSE UR STRIP.AUTO-MCNC: NEGATIVE MG/DL
HCT VFR BLD AUTO: 46.6 % (ref 39–53)
HDLC SERPL-MCNC: 58 MG/DL (ref 40–59)
HGB BLD-MCNC: 14.9 G/DL (ref 13–17.5)
IMM GRANULOCYTES # BLD AUTO: 0.01 X10(3) UL (ref 0–1)
IMM GRANULOCYTES NFR BLD: 0.2 %
KETONES UR STRIP.AUTO-MCNC: NEGATIVE MG/DL
LDLC SERPL CALC-MCNC: 94 MG/DL (ref ?–100)
LEUKOCYTE ESTERASE UR QL STRIP.AUTO: NEGATIVE
LYMPHOCYTES # BLD AUTO: 2.07 X10(3) UL (ref 1–4)
LYMPHOCYTES NFR BLD AUTO: 38.3 %
M PROTEIN MFR SERPL ELPH: 7 G/DL (ref 6.4–8.2)
MCH RBC QN AUTO: 31.3 PG (ref 26–34)
MCHC RBC AUTO-ENTMCNC: 32 G/DL (ref 31–37)
MCV RBC AUTO: 97.9 FL (ref 80–100)
MONOCYTES # BLD AUTO: 0.43 X10(3) UL (ref 0.1–1)
MONOCYTES NFR BLD AUTO: 8 %
NEUTROPHILS # BLD AUTO: 2.58 X10 (3) UL (ref 1.5–7.7)
NEUTROPHILS # BLD AUTO: 2.58 X10(3) UL (ref 1.5–7.7)
NEUTROPHILS NFR BLD AUTO: 47.7 %
NITRITE UR QL STRIP.AUTO: NEGATIVE
NONHDLC SERPL-MCNC: 111 MG/DL (ref ?–130)
OSMOLALITY SERPL CALC.SUM OF ELEC: 295 MOSM/KG (ref 275–295)
PATIENT FASTING Y/N/NP: YES
PATIENT FASTING Y/N/NP: YES
PH UR STRIP.AUTO: 5 [PH] (ref 4.5–8)
PLATELET # BLD AUTO: 201 10(3)UL (ref 150–450)
POTASSIUM SERPL-SCNC: 4.2 MMOL/L (ref 3.5–5.1)
PROT UR STRIP.AUTO-MCNC: NEGATIVE MG/DL
PSA SERPL-MCNC: 1.94 NG/ML (ref ?–4)
RBC # BLD AUTO: 4.76 X10(6)UL (ref 3.8–5.8)
RBC UR QL AUTO: NEGATIVE
SODIUM SERPL-SCNC: 140 MMOL/L (ref 136–145)
SP GR UR STRIP.AUTO: 1.02 (ref 1–1.03)
T4 FREE SERPL-MCNC: 1.2 NG/DL (ref 0.8–1.7)
TRIGL SERPL-MCNC: 86 MG/DL (ref 30–149)
TSI SER-ACNC: 1.14 MIU/ML (ref 0.36–3.74)
UROBILINOGEN UR STRIP.AUTO-MCNC: <2 MG/DL
VLDLC SERPL CALC-MCNC: 17 MG/DL (ref 0–30)
WBC # BLD AUTO: 5.4 X10(3) UL (ref 4–11)

## 2020-07-07 PROCEDURE — 36415 COLL VENOUS BLD VENIPUNCTURE: CPT

## 2020-07-07 PROCEDURE — 84439 ASSAY OF FREE THYROXINE: CPT

## 2020-07-07 PROCEDURE — 84153 ASSAY OF PSA TOTAL: CPT

## 2020-07-07 PROCEDURE — 84443 ASSAY THYROID STIM HORMONE: CPT

## 2020-07-07 PROCEDURE — 80061 LIPID PANEL: CPT

## 2020-07-07 PROCEDURE — 81003 URINALYSIS AUTO W/O SCOPE: CPT

## 2020-07-07 PROCEDURE — 80053 COMPREHEN METABOLIC PANEL: CPT

## 2020-07-07 PROCEDURE — 85025 COMPLETE CBC W/AUTO DIFF WBC: CPT

## 2020-07-09 ENCOUNTER — OFFICE VISIT (OUTPATIENT)
Dept: FAMILY MEDICINE CLINIC | Facility: CLINIC | Age: 75
End: 2020-07-09
Payer: MEDICARE

## 2020-07-09 ENCOUNTER — TELEPHONE (OUTPATIENT)
Dept: FAMILY MEDICINE CLINIC | Facility: CLINIC | Age: 75
End: 2020-07-09

## 2020-07-09 VITALS
HEIGHT: 74 IN | HEART RATE: 68 BPM | WEIGHT: 171 LBS | SYSTOLIC BLOOD PRESSURE: 124 MMHG | BODY MASS INDEX: 21.94 KG/M2 | DIASTOLIC BLOOD PRESSURE: 62 MMHG | RESPIRATION RATE: 16 BRPM | TEMPERATURE: 98 F

## 2020-07-09 DIAGNOSIS — Z00.00 ENCOUNTER FOR ANNUAL HEALTH EXAMINATION: Primary | ICD-10-CM

## 2020-07-09 DIAGNOSIS — L21.9 SEBORRHEIC DERMATITIS OF SCALP: ICD-10-CM

## 2020-07-09 DIAGNOSIS — E04.2 MULTINODULAR THYROID: ICD-10-CM

## 2020-07-09 DIAGNOSIS — E78.00 PURE HYPERCHOLESTEROLEMIA: ICD-10-CM

## 2020-07-09 PROCEDURE — 99213 OFFICE O/P EST LOW 20 MIN: CPT | Performed by: FAMILY MEDICINE

## 2020-07-09 PROCEDURE — G0439 PPPS, SUBSEQ VISIT: HCPCS | Performed by: FAMILY MEDICINE

## 2020-07-09 RX ORDER — CLOBETASOL PROPIONATE 0.05 G/100ML
1 SHAMPOO TOPICAL DAILY
Qty: 118 ML | Refills: 3 | Status: SHIPPED | OUTPATIENT
Start: 2020-07-09 | End: 2021-01-08 | Stop reason: ALTCHOICE

## 2020-07-09 NOTE — PROGRESS NOTES
HPI:   Alia Aguilar is a 66year old male who presents for a Medicare Subsequent Annual Wellness visit (Pt already had Initial Annual Wellness). Alia Aguilar is a 66year old male who presents for recheck of hyperlipidemia.  Patient reports t Allergies. CURRENT MEDICATIONS:   Clobetasol Propionate 0.05 % External Shampoo, Apply 1 Application topically daily.   ATORVASTATIN 40 MG Oral Tab, TAKE ONE TABLET BY MOUTH EVERY EVENING  folic acid 526 MCG Oral Tab, Take 400 mcg by mouth every other da °C)   Resp 16   Ht 74\"   Wt 171 lb (77.6 kg)   BMI 21.96 kg/m²   Estimated body mass index is 21.96 kg/m² as calculated from the following:    Height as of this encounter: 74\". Weight as of this encounter: 171 lb (77.6 kg).     Medicare Hearing Assessm (BFE=[id number]); Future    4. Seborrheic dermatitis of scalp  - Clobetasol Propionate 0.05 % External Shampoo; Apply 1 Application topically daily.   Dispense: 118 mL; Refill: 3        Diet assessment: excellent     Advanced Directive:  Living Will on file in E conditions?: 0-No    Have you fallen in the last 12 months?: 0-No    Do you accidently lose urine?: 0-No    Do you have difficulty seeing?: 0-No    Do you have any difficulty walking or getting up?: 0-No    Do you have any tripping hazards?: 0-No    Are yo applicable    Flex Sigmoidoscopy Screen every 10 years No results found for this or any previous visit. No flowsheet data found. Fecal Occult Blood Annually No results found for: FOB No flowsheet data found.     Glaucoma Screening      Ophthalmology Inis Mate data found. No flowsheet data found. COPD      Spirometry Testing Annually Spirometry date:  No flowsheet data found.

## 2020-07-09 NOTE — PATIENT INSTRUCTIONS
Hever Curtis's SCREENING SCHEDULE   Tests on this list are recommended by your physician but may not be covered, or covered at this frequency, by your insurer. Please check with your insurance carrier before scheduling to verify coverage.     PREVENT aortic aneurysm screening (once between ages 73-68)  No results found for this or any previous visit.  Limited to patients who meet one of the following criteria:   • Men who are 73-68 years old and have smoked more than 100 cigarettes in their lifetime   • disease   Hemophiliacs who received Factor VIII or IX concentrates   Clients of institutions for the mentally retarded   Persons who live in the same house as a HepB virus carrier   Homosexual men   Illicit injectable drug abusers     Tetanus Toxoid- Only

## 2020-07-09 NOTE — TELEPHONE ENCOUNTER
[facility] called and states Clobetasol Propionate shampoo is not covered. Pharmacy does not have alternative. To call pt and advise to either call the pharmacy back and ask how much it cost out of pocket and pay for it or call the insurance for alternative.

## 2020-07-13 ENCOUNTER — TELEPHONE (OUTPATIENT)
Dept: FAMILY MEDICINE CLINIC | Facility: CLINIC | Age: 75
End: 2020-07-13

## 2020-07-13 DIAGNOSIS — L21.9 SEBORRHEIC DERMATITIS OF SCALP: Primary | ICD-10-CM

## 2020-07-13 NOTE — TELEPHONE ENCOUNTER
[employer] calling for addl dx / info   Ref # [id number]   [phone]     Re Clobetasol Shampoo     Will consult with Yesica--if been processed?    Per Ashley Randolph ok to call them back    [address] again    Need to try and fail at least two of below prior use of preferred Rx  Or provide clinical reason why can't try below meds first    Fluticasone Cream   Halobetasol Cream  Hydrocortisone cream  Betametasone  Mometasone  Triamcinolone  Fluocinonide   Prednicarbate    Pls advise   Thank you

## 2020-07-15 RX ORDER — KETOCONAZOLE 20 MG/ML
5 SHAMPOO TOPICAL DAILY
Qty: 1 BOTTLE | Refills: 1 | Status: SHIPPED | OUTPATIENT
Start: 2020-07-15 | End: 2020-07-29

## 2020-07-15 NOTE — TELEPHONE ENCOUNTER
Insurance is not covering shampoo [phone]  Member ID [id number]  The past 2 shampoos have been denied by insurance. I did call the above # and Kenna brought up formulary. No alternatives listed. Ketoconazole shampoo 2% is tier 3 and requires no auth.

## 2020-08-31 RX ORDER — ATORVASTATIN CALCIUM 40 MG/1
TABLET, FILM COATED ORAL
Qty: 90 TABLET | Refills: 1 | Status: SHIPPED | OUTPATIENT
Start: 2020-08-31 | End: 2021-02-26

## 2021-01-08 ENCOUNTER — OFFICE VISIT (OUTPATIENT)
Dept: FAMILY MEDICINE CLINIC | Facility: CLINIC | Age: 76
End: 2021-01-08
Payer: COMMERCIAL

## 2021-01-08 VITALS
HEART RATE: 68 BPM | BODY MASS INDEX: 21.94 KG/M2 | DIASTOLIC BLOOD PRESSURE: 74 MMHG | SYSTOLIC BLOOD PRESSURE: 126 MMHG | HEIGHT: 74 IN | WEIGHT: 171 LBS | TEMPERATURE: 97 F | RESPIRATION RATE: 12 BRPM

## 2021-01-08 DIAGNOSIS — R10.32 LEFT LOWER QUADRANT ABDOMINAL PAIN: Primary | ICD-10-CM

## 2021-01-08 LAB
APPEARANCE: CLEAR
MULTISTIX LOT#: 1044 NUMERIC
PH, URINE: 7 (ref 4.5–8)
SPECIFIC GRAVITY: 1.02 (ref 1–1.03)
URINE-COLOR: YELLOW
UROBILINOGEN,SEMI-QN: 0.2 MG/DL (ref 0–1.9)

## 2021-01-08 PROCEDURE — 3074F SYST BP LT 130 MM HG: CPT | Performed by: FAMILY MEDICINE

## 2021-01-08 PROCEDURE — 3078F DIAST BP <80 MM HG: CPT | Performed by: FAMILY MEDICINE

## 2021-01-08 PROCEDURE — 99213 OFFICE O/P EST LOW 20 MIN: CPT | Performed by: FAMILY MEDICINE

## 2021-01-08 PROCEDURE — 81003 URINALYSIS AUTO W/O SCOPE: CPT | Performed by: FAMILY MEDICINE

## 2021-01-08 PROCEDURE — 3008F BODY MASS INDEX DOCD: CPT | Performed by: FAMILY MEDICINE

## 2021-01-08 NOTE — PROGRESS NOTES
Brad Patrick is a 66year old male. HPI:   Patient is a 78-year-old male who gives a history of left lower quadrant abdominal pain on and off for a month and more persistent over the past week. He denies any change in his bowel movements.   He denies lesions  GI: good BS's,no masses, HSM or tenderness. Points to his left groin has the area where he feels the discomfort.   EXTREMITIES: no cyanosis, clubbing or edema    ASSESSMENT AND PLAN:     Left lower quadrant abdominal pain  (primary encounter diagn

## 2021-02-14 NOTE — PROGRESS NOTES
Can someone book a follow up appointment for shoulder pain with ? Transfer from [facility], left renal colic

## 2021-02-23 NOTE — TELEPHONE ENCOUNTER
Please call pt to schedule med check with Dr. Zachariah Rene and remind to complete fasting blood work before appt. Thank you.

## 2021-02-26 RX ORDER — ATORVASTATIN CALCIUM 40 MG/1
TABLET, FILM COATED ORAL
Qty: 90 TABLET | Refills: 0 | Status: SHIPPED | OUTPATIENT
Start: 2021-02-26 | End: 2021-05-28

## 2021-02-26 NOTE — TELEPHONE ENCOUNTER
Pt has appt 3/2/21 and made aware to complete bloodwork.      States he will need a refill before then

## 2021-02-27 ENCOUNTER — LAB ENCOUNTER (OUTPATIENT)
Dept: LAB | Facility: HOSPITAL | Age: 76
End: 2021-02-27
Attending: FAMILY MEDICINE
Payer: MEDICARE

## 2021-02-27 DIAGNOSIS — E78.00 PURE HYPERCHOLESTEROLEMIA: ICD-10-CM

## 2021-02-27 LAB
ALBUMIN SERPL-MCNC: 3.3 G/DL (ref 3.4–5)
ALBUMIN/GLOB SERPL: 0.9 {RATIO} (ref 1–2)
ALP LIVER SERPL-CCNC: 77 U/L
ALT SERPL-CCNC: 33 U/L
ANION GAP SERPL CALC-SCNC: 4 MMOL/L (ref 0–18)
AST SERPL-CCNC: 23 U/L (ref 15–37)
BILIRUB SERPL-MCNC: 1.6 MG/DL (ref 0.1–2)
BUN BLD-MCNC: 18 MG/DL (ref 7–18)
BUN/CREAT SERPL: 18.8 (ref 10–20)
CALCIUM BLD-MCNC: 9.1 MG/DL (ref 8.5–10.1)
CHLORIDE SERPL-SCNC: 107 MMOL/L (ref 98–112)
CHOLEST SMN-MCNC: 176 MG/DL (ref ?–200)
CO2 SERPL-SCNC: 30 MMOL/L (ref 21–32)
CREAT BLD-MCNC: 0.96 MG/DL
GLOBULIN PLAS-MCNC: 3.5 G/DL (ref 2.8–4.4)
GLUCOSE BLD-MCNC: 89 MG/DL (ref 70–99)
HDLC SERPL-MCNC: 64 MG/DL (ref 40–59)
LDLC SERPL CALC-MCNC: 98 MG/DL (ref ?–100)
M PROTEIN MFR SERPL ELPH: 6.8 G/DL (ref 6.4–8.2)
NONHDLC SERPL-MCNC: 112 MG/DL (ref ?–130)
OSMOLALITY SERPL CALC.SUM OF ELEC: 293 MOSM/KG (ref 275–295)
PATIENT FASTING Y/N/NP: YES
PATIENT FASTING Y/N/NP: YES
POTASSIUM SERPL-SCNC: 4.3 MMOL/L (ref 3.5–5.1)
SODIUM SERPL-SCNC: 141 MMOL/L (ref 136–145)
TRIGL SERPL-MCNC: 69 MG/DL (ref 30–149)
VLDLC SERPL CALC-MCNC: 14 MG/DL (ref 0–30)

## 2021-02-27 PROCEDURE — 80053 COMPREHEN METABOLIC PANEL: CPT

## 2021-02-27 PROCEDURE — 36415 COLL VENOUS BLD VENIPUNCTURE: CPT

## 2021-02-27 PROCEDURE — 80061 LIPID PANEL: CPT

## 2021-03-02 ENCOUNTER — OFFICE VISIT (OUTPATIENT)
Dept: FAMILY MEDICINE CLINIC | Facility: CLINIC | Age: 76
End: 2021-03-02
Payer: COMMERCIAL

## 2021-03-02 VITALS
DIASTOLIC BLOOD PRESSURE: 72 MMHG | HEART RATE: 68 BPM | BODY MASS INDEX: 22.07 KG/M2 | WEIGHT: 172 LBS | HEIGHT: 74 IN | SYSTOLIC BLOOD PRESSURE: 110 MMHG | RESPIRATION RATE: 16 BRPM | TEMPERATURE: 97 F

## 2021-03-02 DIAGNOSIS — L21.0 DANDRUFF IN ADULT: ICD-10-CM

## 2021-03-02 DIAGNOSIS — Z13.0 SCREENING FOR IRON DEFICIENCY ANEMIA: ICD-10-CM

## 2021-03-02 DIAGNOSIS — N40.0 BENIGN PROSTATIC HYPERPLASIA WITHOUT LOWER URINARY TRACT SYMPTOMS: ICD-10-CM

## 2021-03-02 DIAGNOSIS — E78.00 PURE HYPERCHOLESTEROLEMIA: Primary | ICD-10-CM

## 2021-03-02 PROCEDURE — 3074F SYST BP LT 130 MM HG: CPT | Performed by: FAMILY MEDICINE

## 2021-03-02 PROCEDURE — 3078F DIAST BP <80 MM HG: CPT | Performed by: FAMILY MEDICINE

## 2021-03-02 PROCEDURE — 3008F BODY MASS INDEX DOCD: CPT | Performed by: FAMILY MEDICINE

## 2021-03-02 PROCEDURE — 99214 OFFICE O/P EST MOD 30 MIN: CPT | Performed by: FAMILY MEDICINE

## 2021-03-02 RX ORDER — CLOBETASOL PROPIONATE 0.46 MG/ML
1 SOLUTION TOPICAL NIGHTLY
Qty: 50 ML | Refills: 2 | Status: SHIPPED | OUTPATIENT
Start: 2021-03-02 | End: 2021-09-14 | Stop reason: ALTCHOICE

## 2021-03-02 NOTE — PROGRESS NOTES
Silvia Dalton is a 66year old male. HPI:   Silvia Dalton is a 66year old male who presents for recheck of hyperlipidemia. Patient reports taking medications as instructed, no medication side effects noted. Denies any generalized muscle aches.   H heartburn  NEURO: denies headaches    EXAM:   /72   Pulse 68   Temp 97.2 °F (36.2 °C)   Resp 16   Ht 6' 2\" (1.88 m)   Wt 172 lb (78 kg)   BMI 22.08 kg/m²   GENERAL: well developed, well nourished,in no apparent distress  SKIN: no rashes,no suspiciou

## 2021-05-28 RX ORDER — ATORVASTATIN CALCIUM 40 MG/1
TABLET, FILM COATED ORAL
Qty: 90 TABLET | Refills: 0 | Status: SHIPPED | OUTPATIENT
Start: 2021-05-28 | End: 2021-08-23

## 2021-08-23 ENCOUNTER — TELEPHONE (OUTPATIENT)
Dept: FAMILY MEDICINE CLINIC | Facility: CLINIC | Age: 76
End: 2021-08-23

## 2021-08-23 RX ORDER — ATORVASTATIN CALCIUM 40 MG/1
40 TABLET, FILM COATED ORAL EVERY EVENING
Qty: 90 TABLET | Refills: 0 | Status: SHIPPED | OUTPATIENT
Start: 2021-08-23 | End: 2021-11-22

## 2021-08-23 NOTE — TELEPHONE ENCOUNTER
New pharmacy:  Please send refill of ATORVASTATIN 40 MG Oral Tab to [facility].   (90 day supply)

## 2021-08-23 NOTE — TELEPHONE ENCOUNTER
LOV 03/02/2021. NOV 09/14/2021. Last labs were due in July. My chart message sent to pt to please get labs done prior to his appt in September.

## 2021-09-03 ENCOUNTER — LAB ENCOUNTER (OUTPATIENT)
Dept: LAB | Age: 76
End: 2021-09-03
Attending: FAMILY MEDICINE
Payer: MEDICARE

## 2021-09-03 DIAGNOSIS — N40.0 BENIGN PROSTATIC HYPERPLASIA WITHOUT LOWER URINARY TRACT SYMPTOMS: ICD-10-CM

## 2021-09-03 DIAGNOSIS — E78.00 PURE HYPERCHOLESTEROLEMIA: ICD-10-CM

## 2021-09-03 DIAGNOSIS — Z13.0 SCREENING FOR IRON DEFICIENCY ANEMIA: ICD-10-CM

## 2021-09-03 LAB
ALBUMIN SERPL-MCNC: 3.3 G/DL (ref 3.4–5)
ALBUMIN/GLOB SERPL: 0.9 {RATIO} (ref 1–2)
ALP LIVER SERPL-CCNC: 81 U/L
ALT SERPL-CCNC: 36 U/L
ANION GAP SERPL CALC-SCNC: 3 MMOL/L (ref 0–18)
AST SERPL-CCNC: 21 U/L (ref 15–37)
BASOPHILS # BLD AUTO: 0.08 X10(3) UL (ref 0–0.2)
BASOPHILS NFR BLD AUTO: 1.5 %
BILIRUB SERPL-MCNC: 1.4 MG/DL (ref 0.1–2)
BUN BLD-MCNC: 18 MG/DL (ref 7–18)
CALCIUM BLD-MCNC: 8.8 MG/DL (ref 8.5–10.1)
CHLORIDE SERPL-SCNC: 107 MMOL/L (ref 98–112)
CHOLEST SMN-MCNC: 190 MG/DL (ref ?–200)
CO2 SERPL-SCNC: 30 MMOL/L (ref 21–32)
CREAT BLD-MCNC: 1.06 MG/DL
EOSINOPHIL # BLD AUTO: 0.3 X10(3) UL (ref 0–0.7)
EOSINOPHIL NFR BLD AUTO: 5.5 %
ERYTHROCYTE [DISTWIDTH] IN BLOOD BY AUTOMATED COUNT: 12.6 %
GLOBULIN PLAS-MCNC: 3.6 G/DL (ref 2.8–4.4)
GLUCOSE BLD-MCNC: 93 MG/DL (ref 70–99)
HCT VFR BLD AUTO: 47.2 %
HDLC SERPL-MCNC: 60 MG/DL (ref 40–59)
HGB BLD-MCNC: 15 G/DL
IMM GRANULOCYTES # BLD AUTO: 0.01 X10(3) UL (ref 0–1)
IMM GRANULOCYTES NFR BLD: 0.2 %
LDLC SERPL CALC-MCNC: 119 MG/DL (ref ?–100)
LYMPHOCYTES # BLD AUTO: 2.08 X10(3) UL (ref 1–4)
LYMPHOCYTES NFR BLD AUTO: 37.8 %
M PROTEIN MFR SERPL ELPH: 6.9 G/DL (ref 6.4–8.2)
MCH RBC QN AUTO: 30.8 PG (ref 26–34)
MCHC RBC AUTO-ENTMCNC: 31.8 G/DL (ref 31–37)
MCV RBC AUTO: 96.9 FL
MONOCYTES # BLD AUTO: 0.43 X10(3) UL (ref 0.1–1)
MONOCYTES NFR BLD AUTO: 7.8 %
NEUTROPHILS # BLD AUTO: 2.6 X10 (3) UL (ref 1.5–7.7)
NEUTROPHILS # BLD AUTO: 2.6 X10(3) UL (ref 1.5–7.7)
NEUTROPHILS NFR BLD AUTO: 47.2 %
NONHDLC SERPL-MCNC: 130 MG/DL (ref ?–130)
OSMOLALITY SERPL CALC.SUM OF ELEC: 292 MOSM/KG (ref 275–295)
PATIENT FASTING Y/N/NP: YES
PATIENT FASTING Y/N/NP: YES
PLATELET # BLD AUTO: 237 10(3)UL (ref 150–450)
POTASSIUM SERPL-SCNC: 4.6 MMOL/L (ref 3.5–5.1)
PSA SERPL-MCNC: 2.58 NG/ML (ref ?–4)
RBC # BLD AUTO: 4.87 X10(6)UL
SODIUM SERPL-SCNC: 140 MMOL/L (ref 136–145)
TRIGL SERPL-MCNC: 60 MG/DL (ref 30–149)
VLDLC SERPL CALC-MCNC: 10 MG/DL (ref 0–30)
WBC # BLD AUTO: 5.5 X10(3) UL (ref 4–11)

## 2021-09-03 PROCEDURE — 84153 ASSAY OF PSA TOTAL: CPT

## 2021-09-03 PROCEDURE — 85025 COMPLETE CBC W/AUTO DIFF WBC: CPT

## 2021-09-03 PROCEDURE — 80061 LIPID PANEL: CPT

## 2021-09-03 PROCEDURE — 80053 COMPREHEN METABOLIC PANEL: CPT

## 2021-09-03 PROCEDURE — 36415 COLL VENOUS BLD VENIPUNCTURE: CPT

## 2021-09-14 ENCOUNTER — OFFICE VISIT (OUTPATIENT)
Dept: FAMILY MEDICINE CLINIC | Facility: CLINIC | Age: 76
End: 2021-09-14
Payer: COMMERCIAL

## 2021-09-14 VITALS
HEART RATE: 72 BPM | HEIGHT: 74 IN | WEIGHT: 172 LBS | SYSTOLIC BLOOD PRESSURE: 110 MMHG | TEMPERATURE: 98 F | RESPIRATION RATE: 16 BRPM | BODY MASS INDEX: 22.07 KG/M2 | DIASTOLIC BLOOD PRESSURE: 70 MMHG

## 2021-09-14 DIAGNOSIS — L21.9 SEBORRHEIC DERMATITIS OF SCALP: ICD-10-CM

## 2021-09-14 DIAGNOSIS — E78.00 PURE HYPERCHOLESTEROLEMIA: ICD-10-CM

## 2021-09-14 DIAGNOSIS — R25.1 TREMOR OF LEFT HAND: ICD-10-CM

## 2021-09-14 DIAGNOSIS — Z00.00 ENCOUNTER FOR ANNUAL HEALTH EXAMINATION: Primary | ICD-10-CM

## 2021-09-14 PROCEDURE — 99397 PER PM REEVAL EST PAT 65+ YR: CPT | Performed by: FAMILY MEDICINE

## 2021-09-14 PROCEDURE — 96160 PT-FOCUSED HLTH RISK ASSMT: CPT | Performed by: FAMILY MEDICINE

## 2021-09-14 PROCEDURE — 3078F DIAST BP <80 MM HG: CPT | Performed by: FAMILY MEDICINE

## 2021-09-14 PROCEDURE — G0439 PPPS, SUBSEQ VISIT: HCPCS | Performed by: FAMILY MEDICINE

## 2021-09-14 PROCEDURE — 3074F SYST BP LT 130 MM HG: CPT | Performed by: FAMILY MEDICINE

## 2021-09-14 PROCEDURE — 3008F BODY MASS INDEX DOCD: CPT | Performed by: FAMILY MEDICINE

## 2021-09-14 NOTE — PROGRESS NOTES
HPI:   Debby Mtz is a 66year old male who presents for a Medicare Subsequent Annual Wellness visit (Pt already had Initial Annual Wellness)    Debby Mtz is a 66year old male who presents for recheck of hyperlipidemia.  Patient reports ta 09/03/2021    .0 09/03/2021          ALLERGIES:   He has No Known Allergies. CURRENT MEDICATIONS:   atorvastatin 40 MG Oral Tab, Take 1 tablet (40 mg total) by mouth every evening.   folic acid 775 MCG Oral Tab, Take 400 mcg by mouth every other d kg/m²   Estimated body mass index is 22.08 kg/m² as calculated from the following:    Height as of this encounter: 6' 2\" (1.88 m). Weight as of this encounter: 172 lb (78 kg).     Medicare Hearing Assessment  (Required for AWV/SWV)    Hearing Screening atorvastatin 40 mg 1 tablet daily  - COMP METABOLIC PANEL (14); Future  - LIPID PANEL; Future    3. Seborrheic dermatitis of scalp  Will retry clobetasol external solution at night    4.  Tremor of left hand  Observe for now            Diet assessment: exce bathing?: No    Problems with daily activities? : No    Memory Problems?: No      Fall/Risk Assessment     Do you have 3 or more medical conditions?: 0-No    Have you fallen in the last 12 months?: 0-No    Do you accidently lose urine?: 0-No    Do you have Date Value   05/21/2014 120        EKG - w/ Initial Preventative Physical Exam only, or if medically necessary Electrocardiogram date10/04/2016    Colorectal Cancer Screening      Colonoscopy Screen every 10 years No recommendations at this time Update H Annually      LDL  Annually LDL Cholesterol (mg/dL)   Date Value   09/03/2021 119 (H)     LDL-CHOLESTEROL (mg/dL (calc))   Date Value   11/11/2011 134 (H)     LDL CHOLESTROL (mg/dL)   Date Value   05/21/2014 120    No flowsheet data found.      Dilated Eye

## 2021-09-14 NOTE — PATIENT INSTRUCTIONS
Hever Curtis's SCREENING SCHEDULE   Tests on this list are recommended by your physician but may not be covered, or covered at this frequency, by your insurer. Please check with your insurance carrier before scheduling to verify coverage.    PREVEN 10/07/2020  No recommendations at this time    Pneumococcal Each vaccine ([id number] & [id number]) covered once after 65 Prevnar 13: 03/30/2016    [id number]: 03/07/2017     No recommendations at this time    Hepatitis B One screening covered for patients

## 2021-11-22 RX ORDER — ATORVASTATIN CALCIUM 40 MG/1
TABLET, FILM COATED ORAL
Qty: 90 TABLET | Refills: 0 | Status: SHIPPED | OUTPATIENT
Start: 2021-11-22

## 2021-11-29 ENCOUNTER — HOSPITAL ENCOUNTER (OUTPATIENT)
Age: 76
Discharge: HOME | End: 2021-11-29
Payer: MEDICARE

## 2021-11-29 VITALS
SYSTOLIC BLOOD PRESSURE: 130 MMHG | HEART RATE: 65 BPM | BODY MASS INDEX: 22 KG/M2 | RESPIRATION RATE: 16 BRPM | OXYGEN SATURATION: 98 % | TEMPERATURE: 98 F | WEIGHT: 170 LBS | DIASTOLIC BLOOD PRESSURE: 70 MMHG

## 2021-11-29 DIAGNOSIS — J31.2 CHRONIC PHARYNGITIS: Primary | ICD-10-CM

## 2021-11-29 PROCEDURE — 99212 OFFICE O/P EST SF 10 MIN: CPT

## 2021-11-29 PROCEDURE — 99213 OFFICE O/P EST LOW 20 MIN: CPT

## 2021-11-29 PROCEDURE — 87880 STREP A ASSAY W/OPTIC: CPT

## 2021-11-29 NOTE — ED PROVIDER NOTES
Patient Seen in: [facility]      History   Patient presents with:  Sore Throat    Stated Complaint: Std Screen - rapid strep test    Subjective:   HPI  Patient is 75-year-old male past medical history of hyperlipidemia presents with 6-wee Smokeless tobacco: Never Used    Vaping Use      Vaping Use: Never used    Alcohol use: Yes      Comment: 6 drinks a week     Drug use:  No             Review of Systems    Positive for stated complaint: Std Screen - rapid strep test  Other systems are as n respiratory distress. Breath sounds: Normal breath sounds. No stridor. No wheezing, rhonchi or rales. Chest:      Chest wall: No tenderness. Abdominal:      General: Bowel sounds are normal. There is no distension.       Palpations: Abdomen is soft

## 2021-12-02 ENCOUNTER — OFFICE VISIT (OUTPATIENT)
Dept: FAMILY MEDICINE CLINIC | Facility: CLINIC | Age: 76
End: 2021-12-02
Payer: COMMERCIAL

## 2021-12-02 VITALS
SYSTOLIC BLOOD PRESSURE: 110 MMHG | TEMPERATURE: 98 F | DIASTOLIC BLOOD PRESSURE: 64 MMHG | HEART RATE: 76 BPM | WEIGHT: 177 LBS | RESPIRATION RATE: 16 BRPM | BODY MASS INDEX: 22.72 KG/M2 | HEIGHT: 74 IN

## 2021-12-02 DIAGNOSIS — S76.302A LEFT HAMSTRING INJURY, INITIAL ENCOUNTER: Primary | ICD-10-CM

## 2021-12-02 DIAGNOSIS — R09.82 POSTNASAL DRIP: ICD-10-CM

## 2021-12-02 PROCEDURE — 3074F SYST BP LT 130 MM HG: CPT | Performed by: FAMILY MEDICINE

## 2021-12-02 PROCEDURE — 99214 OFFICE O/P EST MOD 30 MIN: CPT | Performed by: FAMILY MEDICINE

## 2021-12-02 PROCEDURE — 3078F DIAST BP <80 MM HG: CPT | Performed by: FAMILY MEDICINE

## 2021-12-02 PROCEDURE — 3008F BODY MASS INDEX DOCD: CPT | Performed by: FAMILY MEDICINE

## 2021-12-02 RX ORDER — METHYLPREDNISOLONE 4 MG/1
TABLET ORAL
Qty: 21 EACH | Refills: 0 | Status: SHIPPED | OUTPATIENT
Start: 2021-12-02 | End: 2021-12-20

## 2021-12-06 NOTE — PROGRESS NOTES
Subjective:   Sergio Pearl is a 64year old male who presents for Leg Pain (left leg pain buttocks to posterior thigh s x x7 weeks. pain only when  sitting ) and Cough (prod in the am sx x 7 weeks.  also has ST )     Patient is here for 2 main acute is 22.73 kg/m²  Estimated body mass index is 22.73 kg/m² as calculated from the following:    Height as of this encounter: 6' 2\" (1.88 m). Weight as of this encounter: 177 lb (80.3 kg).   General appearance: alert, appears stated age and cooperative  Head:

## 2021-12-15 ENCOUNTER — TELEPHONE (OUTPATIENT)
Dept: PHYSICAL THERAPY | Facility: HOSPITAL | Age: 76
End: 2021-12-15

## 2021-12-16 ENCOUNTER — OFFICE VISIT (OUTPATIENT)
Dept: PHYSICAL THERAPY | Age: 76
End: 2021-12-16
Attending: FAMILY MEDICINE
Payer: MEDICARE

## 2021-12-16 PROCEDURE — 97162 PT EVAL MOD COMPLEX 30 MIN: CPT

## 2021-12-16 PROCEDURE — 97110 THERAPEUTIC EXERCISES: CPT

## 2021-12-16 NOTE — PROGRESS NOTES
LUMBAR SPINE EVALUATION:   Referring Physician: Dr. Jus Kee  Diagnosis: Left hamstring injury, initial encounter (V17.903G)      Date of Service: 12/16/2021       PATIENT SUMMARY   Antonette Ramirez is a 64year old y/o male who presents to therap contributing to symptoms. Possible cause of lower extremity symptoms are referred from lumbar spine. Repeated movement exam demonstrates directional preference of lumbar extension. Functional deficits include but are not limited to prolonged sitting.  Karel consecutive days to improve function with ADL   · Pt will be independent and compliant with comprehensive HEP to maintain progress achieved in PT   · Pt will demonstrate good understanding of proper posture and body mechanics to decrease pain and improve s

## 2021-12-20 ENCOUNTER — OFFICE VISIT (OUTPATIENT)
Dept: NEUROLOGY | Facility: CLINIC | Age: 76
End: 2021-12-20
Payer: COMMERCIAL

## 2021-12-20 VITALS
HEART RATE: 70 BPM | SYSTOLIC BLOOD PRESSURE: 133 MMHG | DIASTOLIC BLOOD PRESSURE: 58 MMHG | BODY MASS INDEX: 23 KG/M2 | WEIGHT: 178 LBS

## 2021-12-20 DIAGNOSIS — R20.2 PARESTHESIAS: ICD-10-CM

## 2021-12-20 DIAGNOSIS — M54.16 LUMBAR RADICULOPATHY: Primary | ICD-10-CM

## 2021-12-20 PROCEDURE — 3075F SYST BP GE 130 - 139MM HG: CPT | Performed by: PHYSICIAN ASSISTANT

## 2021-12-20 PROCEDURE — 99213 OFFICE O/P EST LOW 20 MIN: CPT | Performed by: PHYSICIAN ASSISTANT

## 2021-12-20 PROCEDURE — 3078F DIAST BP <80 MM HG: CPT | Performed by: PHYSICIAN ASSISTANT

## 2021-12-21 ENCOUNTER — OFFICE VISIT (OUTPATIENT)
Dept: PHYSICAL THERAPY | Age: 76
End: 2021-12-21
Attending: FAMILY MEDICINE
Payer: MEDICARE

## 2021-12-21 PROCEDURE — 97110 THERAPEUTIC EXERCISES: CPT

## 2021-12-21 NOTE — PROGRESS NOTES
Diagnosis: Left hamstring injury, initial encounter (51-95-56-74)     Insurance (Authorized # of Visits):  Medicare           Authorizing Physician: Dr. Isaac Farmer MD visit: none scheduled  Fall Risk: standard         Precautions: n/a stability and strengthening exercises.       Charges: 3TE       Total Timed Treatment: 45 min  Total Treatment Time: 45 min

## 2021-12-21 NOTE — PROGRESS NOTES
[facility] ([place], [place]) with [address]  9/24/1945  Primary Care Provider:  Kurt Anderson MD    12/20/2021  Accompanied visit: No    64year old male patient being seen for: Left l Chest pain    • High cholesterol    • Thyroid nodule    • Visual impairment     glasses         Medications:      Current Outpatient Medications:   •  ATORVASTATIN 40 MG Oral Tab, TAKE 1 TABLET(40 MG) BY MOUTH EVERY EVENING, Disp: 90 tablet, Rfl: 0  •  fol understands that if needed, based on condition and or test results, follow up will be readjusted      Christian Rossi PA-C  [facility]  12/20/2021, Time completed 10:24 AM

## 2021-12-23 ENCOUNTER — OFFICE VISIT (OUTPATIENT)
Dept: PHYSICAL THERAPY | Age: 76
End: 2021-12-23
Attending: FAMILY MEDICINE
Payer: MEDICARE

## 2021-12-23 PROCEDURE — 97110 THERAPEUTIC EXERCISES: CPT

## 2021-12-23 NOTE — PROGRESS NOTES
Diagnosis: Left hamstring injury, initial encounter (51-95-56-74)     Insurance (Authorized # of Visits):  Medicare           Authorizing Physician: Dr. Miles Ivory Next MD visit: none scheduled  Fall Risk: standard         Precautions: n/a posture and body mechanics to decrease pain and improve spinal safety       Plan: Re-assess next session and continue with lumbar spine directional preference, posture correction, HEP progression, patient education and postural stability and strengthening

## 2021-12-28 ENCOUNTER — APPOINTMENT (OUTPATIENT)
Dept: PHYSICAL THERAPY | Age: 76
End: 2021-12-28
Attending: FAMILY MEDICINE
Payer: MEDICARE

## 2021-12-28 ENCOUNTER — TELEPHONE (OUTPATIENT)
Dept: PHYSICAL THERAPY | Facility: HOSPITAL | Age: 76
End: 2021-12-28

## 2022-01-04 ENCOUNTER — OFFICE VISIT (OUTPATIENT)
Dept: PHYSICAL THERAPY | Age: 77
End: 2022-01-04
Attending: FAMILY MEDICINE
Payer: MEDICARE

## 2022-01-04 PROCEDURE — 97110 THERAPEUTIC EXERCISES: CPT

## 2022-01-04 NOTE — PROGRESS NOTES
Diagnosis: Left hamstring injury, initial encounter (51-95-56-74)     Insurance (Authorized # of Visits):  Medicare           Authorizing Physician: Dr. Serg Bryan Next MD visit: none scheduled  Fall Risk: standard         Precautions: n/a symptoms in the back). Patient demonstrates improved lumbar mobility with joint mobilization. Goals:   · t will be able to sit for any length of time without pain in his leg.    · Pt will report improved symptom centralization and absence of radicular

## 2022-01-06 ENCOUNTER — OFFICE VISIT (OUTPATIENT)
Dept: PHYSICAL THERAPY | Age: 77
End: 2022-01-06
Attending: FAMILY MEDICINE
Payer: MEDICARE

## 2022-01-06 PROCEDURE — 97110 THERAPEUTIC EXERCISES: CPT

## 2022-01-06 NOTE — PROGRESS NOTES
Diagnosis: Left hamstring injury, initial encounter (51-95-56-74)     Insurance (Authorized # of Visits):  Medicare           Authorizing Physician: Dr. Luna Dooley Next MD visit: none scheduled  Fall Risk: standard         Precautions: n/a overall experiencing less leg pain. Goals:   · t will be able to sit for any length of time without pain in his leg.    · Pt will report improved symptom centralization and absence of radicular symptoms for 3 consecutive days to improve function with

## 2022-01-11 ENCOUNTER — OFFICE VISIT (OUTPATIENT)
Dept: PHYSICAL THERAPY | Age: 77
End: 2022-01-11
Attending: FAMILY MEDICINE
Payer: MEDICARE

## 2022-01-11 PROCEDURE — 97110 THERAPEUTIC EXERCISES: CPT

## 2022-01-11 NOTE — PROGRESS NOTES
Diagnosis: Left hamstring injury, initial encounter (51-95-56-74)     Insurance (Authorized # of Visits):  Medicare           Authorizing Physician: Dr. Kyler Colby Next MD visit: none scheduled  Fall Risk: standard         Precautions: n/a 10  Manual:  Extension mobilization L4-L5 grade iii x 3 minutes  Rotational mobilization L4-L5 grade ii x 2 mintues TherEx:  L side glide (hips towards right) 2 x 10  Prone laying (hips R) x 3 minutes   Prone on elbows x 3 minutes   HEP:  (L) side glide x

## 2022-01-13 ENCOUNTER — OFFICE VISIT (OUTPATIENT)
Dept: PHYSICAL THERAPY | Age: 77
End: 2022-01-13
Attending: FAMILY MEDICINE
Payer: MEDICARE

## 2022-01-13 PROCEDURE — 97110 THERAPEUTIC EXERCISES: CPT

## 2022-01-13 NOTE — PROGRESS NOTES
Diagnosis: Left hamstring injury, initial encounter (51-95-56-74)     Insurance (Authorized # of Visits):  Medicare           Authorizing Physician: Dr. Sheryl Banks Next MD visit: none scheduled  Fall Risk: standard         Precautions: n/a minutes  Prone on elbows x 5 minutes  Prone hip extension x 20 B  Lower trunk rotation x 20  Bridges x 20  SHU x 10  Manual:  Extension mobilization L4-L5 grade iii x 3 minutes  Rotational mobilization L4-L5 grade ii x 2 mintues TherEx:  L side glide (hip

## 2022-01-18 ENCOUNTER — OFFICE VISIT (OUTPATIENT)
Dept: PHYSICAL THERAPY | Age: 77
End: 2022-01-18
Attending: FAMILY MEDICINE
Payer: MEDICARE

## 2022-01-18 PROCEDURE — 97110 THERAPEUTIC EXERCISES: CPT

## 2022-01-18 NOTE — PROGRESS NOTES
PROGRESS NOTE  Diagnosis: Left hamstring injury, initial encounter (51-95-56-74)     Insurance (Authorized # of Visits):  Medicare           Authorizing Physician: Dr. Hiwot Escoto Next MD visit: none scheduled  Fall Risk: standard         Precautions: n Tx 3 Date 01/04/22   Tx 4 Date 01/06/22   Tx 5 Date 01/11/22   Tx 6 Date 01/13/22   Tx 7 Date 01/18/22   Tx 8   TherEx:  Prone laying x 2 minutes  Prone on elbows x 3 minutes  REIL x 10  REIL with therapist OP x 10  Lumbar wall sag x 10  SHU x 20  Rolm Peal understanding of proper posture and body mechanics to decrease pain and improve spinal safety       Plan: Re-assess next session and continue with lumbar spine directional preference, posture correction, HEP progression, patient education and postural stab

## 2022-01-20 ENCOUNTER — OFFICE VISIT (OUTPATIENT)
Dept: PHYSICAL THERAPY | Age: 77
End: 2022-01-20
Attending: FAMILY MEDICINE
Payer: MEDICARE

## 2022-01-20 ENCOUNTER — APPOINTMENT (OUTPATIENT)
Dept: PHYSICAL THERAPY | Age: 77
End: 2022-01-20
Attending: FAMILY MEDICINE
Payer: MEDICARE

## 2022-01-20 PROCEDURE — 97110 THERAPEUTIC EXERCISES: CPT

## 2022-01-20 NOTE — PROGRESS NOTES
Diagnosis: Left hamstring injury, initial encounter (51-95-56-74)     Insurance (Authorized # of Visits):  Medicare           Authorizing Physician: Dr. Carlota Flanagan Next MD visit: none scheduled  Fall Risk: standard         Precautions: n/a minutes  Rotational mobilization L4-L5 grade ii x 2 mintues TherEx:  L side glide (hips towards right) 2 x 10  Prone laying (hips R) x 3 minutes   Prone on elbows x 3 minutes TherEx:  Prone laying x 2 minutes (decreased pain L glut/upper leg; R glut pain a

## 2022-02-01 ENCOUNTER — OFFICE VISIT (OUTPATIENT)
Dept: PHYSICAL THERAPY | Age: 77
End: 2022-02-01
Attending: FAMILY MEDICINE
Payer: MEDICARE

## 2022-02-01 PROCEDURE — 97110 THERAPEUTIC EXERCISES: CPT

## 2022-02-01 PROCEDURE — 97530 THERAPEUTIC ACTIVITIES: CPT

## 2022-02-03 ENCOUNTER — APPOINTMENT (OUTPATIENT)
Dept: PHYSICAL THERAPY | Age: 77
End: 2022-02-03
Attending: FAMILY MEDICINE
Payer: MEDICARE

## 2022-02-09 ENCOUNTER — HOSPITAL ENCOUNTER (OUTPATIENT)
Dept: MRI IMAGING | Age: 77
Discharge: HOME | End: 2022-02-09
Attending: FAMILY MEDICINE
Payer: MEDICARE

## 2022-02-09 DIAGNOSIS — M54.50 CHRONIC RIGHT-SIDED LOW BACK PAIN WITHOUT SCIATICA: ICD-10-CM

## 2022-02-09 DIAGNOSIS — G89.29 CHRONIC RIGHT-SIDED LOW BACK PAIN WITHOUT SCIATICA: ICD-10-CM

## 2022-02-09 PROCEDURE — 72148 MRI LUMBAR SPINE W/O DYE: CPT | Performed by: FAMILY MEDICINE

## 2022-02-16 ENCOUNTER — OFFICE VISIT (OUTPATIENT)
Dept: PAIN CLINIC | Facility: CLINIC | Age: 77
End: 2022-02-16
Payer: COMMERCIAL

## 2022-02-16 VITALS
WEIGHT: 178 LBS | HEART RATE: 66 BPM | DIASTOLIC BLOOD PRESSURE: 80 MMHG | SYSTOLIC BLOOD PRESSURE: 130 MMHG | OXYGEN SATURATION: 97 % | BODY MASS INDEX: 23 KG/M2

## 2022-02-16 DIAGNOSIS — M25.551 CHRONIC PAIN OF BOTH HIPS: Primary | ICD-10-CM

## 2022-02-16 DIAGNOSIS — G89.29 CHRONIC PAIN OF BOTH HIPS: Primary | ICD-10-CM

## 2022-02-16 DIAGNOSIS — M25.552 CHRONIC PAIN OF BOTH HIPS: Primary | ICD-10-CM

## 2022-02-16 PROCEDURE — 3075F SYST BP GE 130 - 139MM HG: CPT | Performed by: ANESTHESIOLOGY

## 2022-02-16 PROCEDURE — 99204 OFFICE O/P NEW MOD 45 MIN: CPT | Performed by: ANESTHESIOLOGY

## 2022-02-16 PROCEDURE — 3079F DIAST BP 80-89 MM HG: CPT | Performed by: ANESTHESIOLOGY

## 2022-02-17 RX ORDER — ATORVASTATIN CALCIUM 40 MG/1
TABLET, FILM COATED ORAL
Qty: 90 TABLET | Refills: 0 | Status: SHIPPED | OUTPATIENT
Start: 2022-02-17

## 2022-02-17 NOTE — TELEPHONE ENCOUNTER
Cholesterol Medication Protocol Passed 02/16/2022 12:07 PM   Protocol Details  ALT < 80    ALT resulted within past year    Lipid panel within past 12 months    Appointment within past 12 or next 3 months     LOV  9/14/21     LAST LAB  9/3/21     LAST RX 11/22/21 90 tabs      Next OV   Future Appointments   Date Time Provider Bina Holcomb   1/9/2023  1:00 PM Kade Cary MD [facility]         PROTOCOL pass

## 2022-02-18 ENCOUNTER — TELEPHONE (OUTPATIENT)
Dept: NEUROLOGY | Facility: CLINIC | Age: 77
End: 2022-02-18

## 2022-02-18 NOTE — TELEPHONE ENCOUNTER
HPI     3 month IOP, HVF, HRT today, Denies eye pain states eyes water a lot as   of late. Pt states she is using all gtts as directed, Cosopt ou BID,   Brimonidine OU TID, Latanoprost OU HS.     Feels like a skim over her eye. Better when glasses are off. Reports she   did take gtts last night and this morning.     Last edited by Maty Sawant MD on 5/7/2018  4:06 PM.   (History)        ROS     Positive for: Neurological (neuropathy since cancer surgery),   Musculoskeletal (rotator cuff repair, history of broken shoulder blade;   bilateral knee replacement), Eyes (CE OS, ERM OS, glaucoma OU),   Respiratory (history of bronchitis, denies SOB/orthopnea), Heme/Lymph   (history of colon cancer; denies blood thinners)    Negative for: Genitourinary (denies flomax), Endocrine (denies DM or   thyroid problems), Cardiovascular (denies HTN)    Last edited by Maty Sawant MD on 5/7/2018  3:57 PM.   (History)        Assessment /Plan     For exam results, see Encounter Report.    Anatomical narrow angle glaucoma of both eyes with borderline intraocular pressure    Pseudophakia, both eyes    Epiretinal membrane, left  -     Posterior Segment OCT Retina-Both eyes; Future    Refractive error            1. Anatomical narrow angle glaucoma   IOP up again today, upper teens, was around 20 OU prior to CE, previously in teens. Appears stable on DFE  3/27/17. HRT stable OD, OS within range of priors. HVF defects appear stable as well lots of fixation losses today OU.     IOP had improved with Latanoprost added QHS OU (6/2013) in addition to Cosopt BID OU. Last HVF appeared to have hemianopsia - not present on repeat test - appears stable with priors - stable inferior arc OD, OS inferior arc seems to come and go - not present 2014, but was present exam before that.  Patent LPI OD.  History of post-CE iritis OS.     Continue Cosopt BID OU and latanoprost QHS OU. Started brimonidine TID OU on 7/6/17 - continue  Prior authorization request completed for: L hip injection   Authorization # NO PRIOR AUTHORIZATION REQUIRED  Authorization dates: N/A  CPT codes approved: 62198  Number of visits/dates of service approved: 1  Physician: Dr. Brooke Virk   Location: [facility] to schedule       Notification or Prior Authorization is not required for the requested services    This [facility] plan does not currently require a prior authorization for these services. If you have general questions about the prior authorization requirements, please call us at [phone] or visit [employer] > Clinician Resources > Advance and Admission Notification Requirements. The number above acknowledges your notification. Please write this number down for future reference. Notification is not a guarantee of coverage or payment.     Decision ID #:[id number] this as well.  Gonio - CHARMAINE pre-CE OD with occasional pain, PAS stable from 2011.     RTC 4 months IOP check. Along with OCT MAcula        hemianopsia Last HVF unreliable - extremely high false negatives, also possible right debra defect? Not present on repeat exam.   2. Nuclear sclerosis  Now pseudopakic OU - OD 8/16/17, OS 11/6/08   3. Pseudophakia  OS - stable    Epiretinal membrane OS VA good at 1 month post op OD as OS, however OCT shows ERM OS, OD WNL. Repeat next visit.   4. Astigmatism with presbyopia MRx given prior visit

## 2022-02-24 ENCOUNTER — HOSPITAL ENCOUNTER (OUTPATIENT)
Facility: HOSPITAL | Age: 77
Setting detail: HOSPITAL OUTPATIENT SURGERY
Discharge: HOME | End: 2022-02-24
Attending: ANESTHESIOLOGY | Admitting: ANESTHESIOLOGY
Payer: MEDICARE

## 2022-02-24 ENCOUNTER — APPOINTMENT (OUTPATIENT)
Dept: GENERAL RADIOLOGY | Facility: HOSPITAL | Age: 77
End: 2022-02-24
Attending: ANESTHESIOLOGY
Payer: MEDICARE

## 2022-02-24 VITALS
HEART RATE: 72 BPM | BODY MASS INDEX: 22.84 KG/M2 | RESPIRATION RATE: 16 BRPM | HEIGHT: 74 IN | TEMPERATURE: 99 F | WEIGHT: 178 LBS | DIASTOLIC BLOOD PRESSURE: 73 MMHG | SYSTOLIC BLOOD PRESSURE: 126 MMHG | OXYGEN SATURATION: 97 %

## 2022-02-24 DIAGNOSIS — M16.10 HIP ARTHRITIS: ICD-10-CM

## 2022-02-24 PROCEDURE — 3E0U33Z INTRODUCTION OF ANTI-INFLAMMATORY INTO JOINTS, PERCUTANEOUS APPROACH: ICD-10-PCS | Performed by: ANESTHESIOLOGY

## 2022-02-24 PROCEDURE — BQ110ZZ FLUOROSCOPY OF LEFT HIP USING HIGH OSMOLAR CONTRAST: ICD-10-PCS | Performed by: ANESTHESIOLOGY

## 2022-02-24 PROCEDURE — 3E0U3BZ INTRODUCTION OF ANESTHETIC AGENT INTO JOINTS, PERCUTANEOUS APPROACH: ICD-10-PCS | Performed by: ANESTHESIOLOGY

## 2022-02-24 PROCEDURE — 77002 NEEDLE LOCALIZATION BY XRAY: CPT | Performed by: ANESTHESIOLOGY

## 2022-02-24 RX ORDER — LIDOCAINE HYDROCHLORIDE 10 MG/ML
INJECTION, SOLUTION EPIDURAL; INFILTRATION; INTRACAUDAL; PERINEURAL
Status: DISCONTINUED | OUTPATIENT
Start: 2022-02-24 | End: 2022-02-24

## 2022-02-24 RX ORDER — METHYLPREDNISOLONE ACETATE 40 MG/ML
INJECTION, SUSPENSION INTRA-ARTICULAR; INTRALESIONAL; INTRAMUSCULAR; SOFT TISSUE
Status: DISCONTINUED | OUTPATIENT
Start: 2022-02-24 | End: 2022-02-24

## 2022-02-24 RX ORDER — ONDANSETRON 2 MG/ML
4 INJECTION INTRAMUSCULAR; INTRAVENOUS ONCE AS NEEDED
Status: DISCONTINUED | OUTPATIENT
Start: 2022-02-24 | End: 2022-02-24

## 2022-02-24 RX ORDER — DIPHENHYDRAMINE HYDROCHLORIDE 50 MG/ML
50 INJECTION INTRAMUSCULAR; INTRAVENOUS ONCE AS NEEDED
Status: DISCONTINUED | OUTPATIENT
Start: 2022-02-24 | End: 2022-02-24

## 2022-03-07 ENCOUNTER — OFFICE VISIT (OUTPATIENT)
Dept: PAIN CLINIC | Facility: CLINIC | Age: 77
End: 2022-03-07
Payer: COMMERCIAL

## 2022-03-07 VITALS — OXYGEN SATURATION: 98 % | SYSTOLIC BLOOD PRESSURE: 126 MMHG | HEART RATE: 74 BPM | DIASTOLIC BLOOD PRESSURE: 70 MMHG

## 2022-03-07 DIAGNOSIS — M70.72 ISCHIAL BURSITIS OF LEFT SIDE: Primary | ICD-10-CM

## 2022-03-07 DIAGNOSIS — G89.29 CHRONIC PAIN OF BOTH HIPS: ICD-10-CM

## 2022-03-07 DIAGNOSIS — M25.551 CHRONIC PAIN OF BOTH HIPS: ICD-10-CM

## 2022-03-07 DIAGNOSIS — M25.552 CHRONIC PAIN OF BOTH HIPS: ICD-10-CM

## 2022-03-07 PROCEDURE — 3078F DIAST BP <80 MM HG: CPT | Performed by: ANESTHESIOLOGY

## 2022-03-07 PROCEDURE — 99214 OFFICE O/P EST MOD 30 MIN: CPT | Performed by: ANESTHESIOLOGY

## 2022-03-07 PROCEDURE — 3074F SYST BP LT 130 MM HG: CPT | Performed by: ANESTHESIOLOGY

## 2022-03-07 NOTE — PROGRESS NOTES
Last procedure: LEFT HIP JOINT INJECTION WITH LOCAL  Date: 02/24/22  Percentage of relief obtained: 0%  Duration of relief: none    Current Pain Score: 3/10

## 2022-03-08 RX ORDER — CLOBETASOL PROPIONATE 0.46 MG/ML
1 SOLUTION TOPICAL NIGHTLY
Qty: 50 ML | Refills: 2 | Status: SHIPPED | OUTPATIENT
Start: 2022-03-08

## 2022-03-10 ENCOUNTER — TELEPHONE (OUTPATIENT)
Dept: NEUROLOGY | Facility: CLINIC | Age: 77
End: 2022-03-10

## 2022-03-10 NOTE — TELEPHONE ENCOUNTER
Prior authorization request completed for: Left Ischial bursa injection    Authorization # NO PRIOR AUTHORIZATION REQUIRED  Authorization dates: N/A  CPT codes approved: 20016  Number of visits/dates of service approved: 1  Physician: Dr. Judye Duverney   Location: [facility] to schedule        Notification or Prior Authorization is not required for the requested services    This [facility] plan does not currently require a prior authorization for these services. If you have general questions about the prior authorization requirements, please call us at [phone] or visit [employer] > Clinician Resources > Advance and Admission Notification Requirements. The number above acknowledges your notification. Please write this number down for future reference. Notification is not a guarantee of coverage or payment.     Decision ID #:[id number]

## 2022-03-14 NOTE — TELEPHONE ENCOUNTER
Question Answer   Anesthesia Type Local   Provider Vandana Fernandez   Location Lab   Medical clearance requested (will send to Pain Navigator) No   Patient has Medicare coverage?  No   Comments (Please list entire procedure name here.) left ischial bursa injection

## 2022-03-17 ENCOUNTER — HOSPITAL ENCOUNTER (OUTPATIENT)
Facility: HOSPITAL | Age: 77
Setting detail: HOSPITAL OUTPATIENT SURGERY
Discharge: HOME | End: 2022-03-17
Attending: ANESTHESIOLOGY | Admitting: ANESTHESIOLOGY
Payer: MEDICARE

## 2022-03-17 ENCOUNTER — APPOINTMENT (OUTPATIENT)
Dept: GENERAL RADIOLOGY | Facility: HOSPITAL | Age: 77
End: 2022-03-17
Attending: ANESTHESIOLOGY
Payer: MEDICARE

## 2022-03-17 VITALS
SYSTOLIC BLOOD PRESSURE: 113 MMHG | DIASTOLIC BLOOD PRESSURE: 66 MMHG | OXYGEN SATURATION: 98 % | RESPIRATION RATE: 14 BRPM | HEART RATE: 62 BPM | TEMPERATURE: 97 F

## 2022-03-17 DIAGNOSIS — M70.72 ISCHIAL BURSITIS OF LEFT SIDE: ICD-10-CM

## 2022-03-17 PROCEDURE — BQ111ZZ FLUOROSCOPY OF LEFT HIP USING LOW OSMOLAR CONTRAST: ICD-10-PCS | Performed by: ANESTHESIOLOGY

## 2022-03-17 PROCEDURE — 3E0U33Z INTRODUCTION OF ANTI-INFLAMMATORY INTO JOINTS, PERCUTANEOUS APPROACH: ICD-10-PCS | Performed by: ANESTHESIOLOGY

## 2022-03-17 PROCEDURE — 3E0U3BZ INTRODUCTION OF ANESTHETIC AGENT INTO JOINTS, PERCUTANEOUS APPROACH: ICD-10-PCS | Performed by: ANESTHESIOLOGY

## 2022-03-17 RX ORDER — METHYLPREDNISOLONE ACETATE 40 MG/ML
INJECTION, SUSPENSION INTRA-ARTICULAR; INTRALESIONAL; INTRAMUSCULAR; SOFT TISSUE
Status: DISCONTINUED | OUTPATIENT
Start: 2022-03-17 | End: 2022-03-17

## 2022-03-17 RX ORDER — ONDANSETRON 2 MG/ML
4 INJECTION INTRAMUSCULAR; INTRAVENOUS ONCE AS NEEDED
Status: DISCONTINUED | OUTPATIENT
Start: 2022-03-17 | End: 2022-03-17

## 2022-03-17 RX ORDER — LIDOCAINE HYDROCHLORIDE 10 MG/ML
INJECTION, SOLUTION EPIDURAL; INFILTRATION; INTRACAUDAL; PERINEURAL
Status: DISCONTINUED | OUTPATIENT
Start: 2022-03-17 | End: 2022-03-17

## 2022-03-17 RX ORDER — DIPHENHYDRAMINE HYDROCHLORIDE 50 MG/ML
50 INJECTION INTRAMUSCULAR; INTRAVENOUS ONCE AS NEEDED
Status: DISCONTINUED | OUTPATIENT
Start: 2022-03-17 | End: 2022-03-17

## 2022-03-17 NOTE — OPERATIVE REPORT
[facility]  Operative Report  3/17/2022     Bernarda Perez Patient Status:  Hospital Outpatient Surgery    1945 MRN [id number]   Location [id number] [place] [address] Attending Tara Shoemaker MD   Hosp Day # 0 PCP Syl Quiñonse MD     Indication: Tao Mendez is a 64year old male with a history ischial bursitis    Preoperative Diagnosis:  Ischial bursitis of left side [M70.72]    Postoperative Diagnosis: Same as above. Procedure performed: LEFT ISCHIAL BURSA INJECTION WITH LOCAL     Anesthesia: Local .    EBL: Less than 1 ml. Procedure Description:  After reviewing the patient's history and performing a focused physical examination, the diagnosis was confirmed and contraindications such as infection and coagulopathy were ruled out. Following review of potential side effects and complications, including but not necessarily limited to infection, allergic reaction, local tissue breakdown, nerve injury, and paresis, the patient indicated they understood and agreed to proceed. After obtaining the informed consent, the patient was brought to the procedure room and monitored. The patient was placed in the prone position. The skin overlying the left back and sacral region was then prepped in usual sterile fashion. AP imaging was used to identify the corresponding ischial tuberosity. The overlying soft tissue was then anesthetized with 5 cc 1% lidocaine. After adequate skin analgesia, a 22-gauge needle was advanced with imaging guidance to contact bone. After negative aspiration for heme 1 cc of Omnipaque injection which was negative for vascular spread. Subsequently, a total mixture of 40 mg of Depo-Medrol with 2 cc of 1% lidocaine was injected. The needle was then withdrawn tip intact. Complications: None. Follow up: The patient was followed in the pain clinic as needed basis.       Ernst Cancino MD

## 2022-04-01 ENCOUNTER — OFFICE VISIT (OUTPATIENT)
Dept: PAIN CLINIC | Facility: CLINIC | Age: 77
End: 2022-04-01
Payer: COMMERCIAL

## 2022-04-01 VITALS
HEART RATE: 74 BPM | BODY MASS INDEX: 23 KG/M2 | OXYGEN SATURATION: 97 % | DIASTOLIC BLOOD PRESSURE: 64 MMHG | WEIGHT: 178 LBS | SYSTOLIC BLOOD PRESSURE: 118 MMHG

## 2022-04-01 DIAGNOSIS — G89.29 CHRONIC PAIN OF BOTH HIPS: Primary | ICD-10-CM

## 2022-04-01 DIAGNOSIS — M25.551 CHRONIC PAIN OF BOTH HIPS: Primary | ICD-10-CM

## 2022-04-01 DIAGNOSIS — M25.552 CHRONIC PAIN OF BOTH HIPS: Primary | ICD-10-CM

## 2022-04-01 PROCEDURE — 3078F DIAST BP <80 MM HG: CPT | Performed by: ANESTHESIOLOGY

## 2022-04-01 PROCEDURE — 99214 OFFICE O/P EST MOD 30 MIN: CPT | Performed by: ANESTHESIOLOGY

## 2022-04-01 PROCEDURE — 3074F SYST BP LT 130 MM HG: CPT | Performed by: ANESTHESIOLOGY

## 2022-04-01 NOTE — PROGRESS NOTES
Last procedure: LEFT ISCHIAL BURSA INJECTION WITH LOCAL  Date: 03/17/22  Percentage of relief obtained: 70%  Duration of relief: current    Current Pain Score: 0-1

## 2022-05-16 RX ORDER — ATORVASTATIN CALCIUM 40 MG/1
TABLET, FILM COATED ORAL
Qty: 90 TABLET | Refills: 0 | Status: SHIPPED | OUTPATIENT
Start: 2022-05-16

## 2022-05-18 ENCOUNTER — LAB ENCOUNTER (OUTPATIENT)
Dept: LAB | Age: 77
End: 2022-05-18
Attending: FAMILY MEDICINE
Payer: MEDICARE

## 2022-05-18 DIAGNOSIS — E78.00 PURE HYPERCHOLESTEROLEMIA: ICD-10-CM

## 2022-05-18 LAB
ALBUMIN SERPL-MCNC: 3.3 G/DL (ref 3.4–5)
ALBUMIN/GLOB SERPL: 0.9 {RATIO} (ref 1–2)
ALP LIVER SERPL-CCNC: 76 U/L
ALT SERPL-CCNC: 44 U/L
ANION GAP SERPL CALC-SCNC: 4 MMOL/L (ref 0–18)
AST SERPL-CCNC: 21 U/L (ref 15–37)
BILIRUB SERPL-MCNC: 1.1 MG/DL (ref 0.1–2)
BUN BLD-MCNC: 17 MG/DL (ref 7–18)
CALCIUM BLD-MCNC: 8.9 MG/DL (ref 8.5–10.1)
CHLORIDE SERPL-SCNC: 107 MMOL/L (ref 98–112)
CHOLEST SERPL-MCNC: 170 MG/DL (ref ?–200)
CO2 SERPL-SCNC: 30 MMOL/L (ref 21–32)
CREAT BLD-MCNC: 1.16 MG/DL
FASTING PATIENT LIPID ANSWER: YES
FASTING STATUS PATIENT QL REPORTED: YES
GLOBULIN PLAS-MCNC: 3.5 G/DL (ref 2.8–4.4)
GLUCOSE BLD-MCNC: 97 MG/DL (ref 70–99)
HDLC SERPL-MCNC: 63 MG/DL (ref 40–59)
LDLC SERPL CALC-MCNC: 97 MG/DL (ref ?–100)
NONHDLC SERPL-MCNC: 107 MG/DL (ref ?–130)
OSMOLALITY SERPL CALC.SUM OF ELEC: 293 MOSM/KG (ref 275–295)
POTASSIUM SERPL-SCNC: 4.5 MMOL/L (ref 3.5–5.1)
PROT SERPL-MCNC: 6.8 G/DL (ref 6.4–8.2)
SODIUM SERPL-SCNC: 141 MMOL/L (ref 136–145)
TRIGL SERPL-MCNC: 49 MG/DL (ref 30–149)
VLDLC SERPL CALC-MCNC: 8 MG/DL (ref 0–30)

## 2022-05-18 PROCEDURE — 36415 COLL VENOUS BLD VENIPUNCTURE: CPT

## 2022-05-18 PROCEDURE — 80053 COMPREHEN METABOLIC PANEL: CPT

## 2022-05-18 PROCEDURE — 80061 LIPID PANEL: CPT

## 2022-06-01 ENCOUNTER — APPOINTMENT (OUTPATIENT)
Dept: URBAN - METROPOLITAN AREA CLINIC 247 | Age: 77
Setting detail: DERMATOLOGY
End: 2022-06-01

## 2022-06-01 DIAGNOSIS — L29.8 OTHER PRURITUS: ICD-10-CM

## 2022-06-01 DIAGNOSIS — L81.4 OTHER MELANIN HYPERPIGMENTATION: ICD-10-CM

## 2022-06-01 DIAGNOSIS — D18.0 HEMANGIOMA: ICD-10-CM

## 2022-06-01 DIAGNOSIS — L82.1 OTHER SEBORRHEIC KERATOSIS: ICD-10-CM

## 2022-06-01 DIAGNOSIS — D22 MELANOCYTIC NEVI: ICD-10-CM

## 2022-06-01 DIAGNOSIS — L57.0 ACTINIC KERATOSIS: ICD-10-CM

## 2022-06-01 PROBLEM — D18.01 HEMANGIOMA OF SKIN AND SUBCUTANEOUS TISSUE: Status: ACTIVE | Noted: 2022-06-01

## 2022-06-01 PROBLEM — D22.5 MELANOCYTIC NEVI OF TRUNK: Status: ACTIVE | Noted: 2022-06-01

## 2022-06-01 PROCEDURE — 17000 DESTRUCT PREMALG LESION: CPT

## 2022-06-01 PROCEDURE — 99203 OFFICE O/P NEW LOW 30 MIN: CPT | Mod: 25

## 2022-06-01 PROCEDURE — 17003 DESTRUCT PREMALG LES 2-14: CPT

## 2022-06-01 PROCEDURE — OTHER LIQUID NITROGEN: OTHER

## 2022-06-01 PROCEDURE — OTHER COUNSELING: OTHER

## 2022-06-01 PROCEDURE — OTHER PRESCRIPTION MEDICATION MANAGEMENT: OTHER

## 2022-06-01 PROCEDURE — OTHER MIPS QUALITY: OTHER

## 2022-06-01 ASSESSMENT — LOCATION DETAILED DESCRIPTION DERM
LOCATION DETAILED: RIGHT ULNAR DORSAL HAND
LOCATION DETAILED: HAIR
LOCATION DETAILED: PERIUMBILICAL SKIN
LOCATION DETAILED: RIGHT FOREHEAD
LOCATION DETAILED: MIDDLE STERNUM
LOCATION DETAILED: RIGHT INFERIOR FOREHEAD
LOCATION DETAILED: RIGHT INFERIOR LATERAL FOREHEAD
LOCATION DETAILED: LEFT MEDIAL FOREHEAD
LOCATION DETAILED: RIGHT MID-UPPER BACK
LOCATION DETAILED: LEFT SUPERIOR UPPER BACK
LOCATION DETAILED: EPIGASTRIC SKIN

## 2022-06-01 ASSESSMENT — LOCATION ZONE DERM
LOCATION ZONE: FACE
LOCATION ZONE: SCALP
LOCATION ZONE: TRUNK
LOCATION ZONE: HAND

## 2022-06-01 ASSESSMENT — LOCATION SIMPLE DESCRIPTION DERM
LOCATION SIMPLE: HAIR
LOCATION SIMPLE: LEFT UPPER BACK
LOCATION SIMPLE: LEFT FOREHEAD
LOCATION SIMPLE: RIGHT HAND
LOCATION SIMPLE: RIGHT UPPER BACK
LOCATION SIMPLE: ABDOMEN
LOCATION SIMPLE: CHEST
LOCATION SIMPLE: RIGHT FOREHEAD

## 2022-06-01 NOTE — PROCEDURE: LIQUID NITROGEN
Show Aperture Variable?: Yes
Post-Care Instructions: I reviewed with the patient in detail post-care instructions. Patient is to wear sunprotection, and avoid picking at any of the treated lesions. Pt may apply Vaseline to crusted or scabbing areas.
Number Of Freeze-Thaw Cycles: 1 freeze-thaw cycle
Render Note In Bullet Format When Appropriate: No
Duration Of Freeze Thaw-Cycle (Seconds): 3
Consent: The patient's consent was obtained including but not limited to risks of crusting, scabbing, blistering, scarring, darker or lighter pigmentary change, recurrence, incomplete removal and infection.
Detail Level: Detailed

## 2022-06-01 NOTE — PROCEDURE: PRESCRIPTION MEDICATION MANAGEMENT
Render In Strict Bullet Format?: No
Detail Level: Zone
Continue Regimen: Pt declined fluocinonide rx, prefers to continue using clobetasol rx at home

## 2022-06-01 NOTE — PROCEDURE: COUNSELING
Detail Level: Zone
Detail Level: Detailed
Sunscreen Recommendations: SPF 30+ daily, broad spectrum
Sunscreen Recommendations: SPF 30+, broad spectrum

## 2022-06-20 ENCOUNTER — OFFICE VISIT (OUTPATIENT)
Dept: FAMILY MEDICINE CLINIC | Facility: CLINIC | Age: 77
End: 2022-06-20
Payer: COMMERCIAL

## 2022-06-20 VITALS — HEIGHT: 74 IN | WEIGHT: 174 LBS | BODY MASS INDEX: 22.33 KG/M2

## 2022-06-20 DIAGNOSIS — E78.00 PURE HYPERCHOLESTEROLEMIA: ICD-10-CM

## 2022-06-20 DIAGNOSIS — R25.1 TREMOR OF BOTH HANDS: ICD-10-CM

## 2022-06-20 DIAGNOSIS — N40.0 BENIGN PROSTATIC HYPERPLASIA WITHOUT LOWER URINARY TRACT SYMPTOMS: ICD-10-CM

## 2022-06-20 DIAGNOSIS — Z00.00 ENCOUNTER FOR ANNUAL HEALTH EXAMINATION: Primary | ICD-10-CM

## 2022-06-20 DIAGNOSIS — Z13.0 SCREENING FOR IRON DEFICIENCY ANEMIA: ICD-10-CM

## 2022-08-22 RX ORDER — ATORVASTATIN CALCIUM 40 MG/1
TABLET, FILM COATED ORAL
Qty: 90 TABLET | Refills: 0 | Status: SHIPPED | OUTPATIENT
Start: 2022-08-22

## 2022-10-11 ENCOUNTER — ORDER TRANSCRIPTION (OUTPATIENT)
Dept: ADMINISTRATIVE | Facility: HOSPITAL | Age: 77
End: 2022-10-11

## 2022-10-11 DIAGNOSIS — R25.1 TREMORS OF NERVOUS SYSTEM: Primary | ICD-10-CM

## 2022-12-03 ENCOUNTER — LAB ENCOUNTER (OUTPATIENT)
Dept: LAB | Age: 77
End: 2022-12-03
Attending: FAMILY MEDICINE
Payer: MEDICARE

## 2022-12-03 DIAGNOSIS — N40.0 BENIGN PROSTATIC HYPERPLASIA WITHOUT LOWER URINARY TRACT SYMPTOMS: ICD-10-CM

## 2022-12-03 DIAGNOSIS — Z13.0 SCREENING FOR IRON DEFICIENCY ANEMIA: ICD-10-CM

## 2022-12-03 DIAGNOSIS — E78.00 PURE HYPERCHOLESTEROLEMIA: ICD-10-CM

## 2022-12-03 LAB
ALBUMIN SERPL-MCNC: 3.6 G/DL (ref 3.4–5)
ALBUMIN/GLOB SERPL: 1.1 {RATIO} (ref 1–2)
ALP LIVER SERPL-CCNC: 78 U/L
ALT SERPL-CCNC: 47 U/L
ANION GAP SERPL CALC-SCNC: 2 MMOL/L (ref 0–18)
AST SERPL-CCNC: 25 U/L (ref 15–37)
BASOPHILS # BLD AUTO: 0.1 X10(3) UL (ref 0–0.2)
BASOPHILS NFR BLD AUTO: 1.4 %
BILIRUB SERPL-MCNC: 1.6 MG/DL (ref 0.1–2)
BUN BLD-MCNC: 17 MG/DL (ref 7–18)
CALCIUM BLD-MCNC: 9.5 MG/DL (ref 8.5–10.1)
CHLORIDE SERPL-SCNC: 108 MMOL/L (ref 98–112)
CHOLEST SERPL-MCNC: 198 MG/DL (ref ?–200)
CO2 SERPL-SCNC: 31 MMOL/L (ref 21–32)
CREAT BLD-MCNC: 1.18 MG/DL
EOSINOPHIL # BLD AUTO: 0.26 X10(3) UL (ref 0–0.7)
EOSINOPHIL NFR BLD AUTO: 3.8 %
ERYTHROCYTE [DISTWIDTH] IN BLOOD BY AUTOMATED COUNT: 12.8 %
FASTING PATIENT LIPID ANSWER: YES
FASTING STATUS PATIENT QL REPORTED: YES
GFR SERPLBLD BASED ON 1.73 SQ M-ARVRAT: 63 ML/MIN/1.73M2 (ref 60–?)
GLOBULIN PLAS-MCNC: 3.4 G/DL (ref 2.8–4.4)
GLUCOSE BLD-MCNC: 104 MG/DL (ref 70–99)
HCT VFR BLD AUTO: 52.3 %
HDLC SERPL-MCNC: 70 MG/DL (ref 40–59)
HGB BLD-MCNC: 16.8 G/DL
IMM GRANULOCYTES # BLD AUTO: 0.01 X10(3) UL (ref 0–1)
IMM GRANULOCYTES NFR BLD: 0.1 %
LDLC SERPL CALC-MCNC: 113 MG/DL (ref ?–100)
LYMPHOCYTES # BLD AUTO: 2.54 X10(3) UL (ref 1–4)
LYMPHOCYTES NFR BLD AUTO: 36.7 %
MCH RBC QN AUTO: 31.8 PG (ref 26–34)
MCHC RBC AUTO-ENTMCNC: 32.1 G/DL (ref 31–37)
MCV RBC AUTO: 99.1 FL
MONOCYTES # BLD AUTO: 0.53 X10(3) UL (ref 0.1–1)
MONOCYTES NFR BLD AUTO: 7.7 %
NEUTROPHILS # BLD AUTO: 3.48 X10 (3) UL (ref 1.5–7.7)
NEUTROPHILS # BLD AUTO: 3.48 X10(3) UL (ref 1.5–7.7)
NEUTROPHILS NFR BLD AUTO: 50.3 %
NONHDLC SERPL-MCNC: 128 MG/DL (ref ?–130)
OSMOLALITY SERPL CALC.SUM OF ELEC: 294 MOSM/KG (ref 275–295)
PLATELET # BLD AUTO: 244 10(3)UL (ref 150–450)
POTASSIUM SERPL-SCNC: 4.7 MMOL/L (ref 3.5–5.1)
PROT SERPL-MCNC: 7 G/DL (ref 6.4–8.2)
PSA SERPL-MCNC: 2.37 NG/ML (ref ?–4)
RBC # BLD AUTO: 5.28 X10(6)UL
SODIUM SERPL-SCNC: 141 MMOL/L (ref 136–145)
TRIGL SERPL-MCNC: 82 MG/DL (ref 30–149)
VLDLC SERPL CALC-MCNC: 14 MG/DL (ref 0–30)
WBC # BLD AUTO: 6.9 X10(3) UL (ref 4–11)

## 2022-12-03 PROCEDURE — 80053 COMPREHEN METABOLIC PANEL: CPT

## 2022-12-03 PROCEDURE — 85025 COMPLETE CBC W/AUTO DIFF WBC: CPT

## 2022-12-03 PROCEDURE — 84153 ASSAY OF PSA TOTAL: CPT

## 2022-12-03 PROCEDURE — 80061 LIPID PANEL: CPT

## 2022-12-03 PROCEDURE — 36415 COLL VENOUS BLD VENIPUNCTURE: CPT

## 2022-12-06 DIAGNOSIS — Z79.899 LONG-TERM USE OF HIGH-RISK MEDICATION: ICD-10-CM

## 2022-12-06 DIAGNOSIS — E78.00 PURE HYPERCHOLESTEROLEMIA: Primary | ICD-10-CM

## 2022-12-06 RX ORDER — ROSUVASTATIN CALCIUM 40 MG/1
40 TABLET, COATED ORAL NIGHTLY
Qty: 90 TABLET | Refills: 1 | Status: SHIPPED | OUTPATIENT
Start: 2022-12-06

## 2023-02-07 ENCOUNTER — OFFICE VISIT (OUTPATIENT)
Dept: ELECTROPHYSIOLOGY | Facility: HOSPITAL | Age: 78
End: 2023-02-07
Attending: FAMILY MEDICINE
Payer: MEDICARE

## 2023-02-07 DIAGNOSIS — R25.1 TREMORS OF NERVOUS SYSTEM: ICD-10-CM

## 2023-02-07 PROCEDURE — 95911 NRV CNDJ TEST 9-10 STUDIES: CPT | Performed by: OTHER

## 2023-02-07 PROCEDURE — 95886 MUSC TEST DONE W/N TEST COMP: CPT | Performed by: OTHER

## 2023-02-07 NOTE — PROCEDURES
[facility]  Nerve Conduction & EMG Report                      Cindi Mendoza, [address]   EMG department   [address], [address]  [phone]          Patient name: Terrence Milner  YOB: 1945  Referring provider: Dr. Pancho Weller  Reason for study: Eval for neuropathy  Brief history: 76year old male with 2-year history of left greater than right tremor in the fingers, without numbness, weakness, or pain in the hands or fingers. Sensory and motor nerve conduction studies, and needle EMG:  Please see separate sheet for waveforms and quantitative nerve conduction data, as well as for tabulated form of electromyographic data. Summary:   1. The bilateral median, ulnar, and left radial sensory responses were normal.  2.  The bilateral median motor responses were normal.  3.  The bilateral ulnar motor responses were normal.  4.  Needle EMG using a concentric needle was performed on selected muscles of the left upper extremity. All muscles tested were normal.    Conclusion:  1. This is a normal study. 2.  There is no evidence of a mononeuropathy, cervical radiculopathy, or polyneuropathy affecting the upper extremities.       Cindi Mendoza, DO  Neurology and Neuromuscular medicine  [facility]

## 2023-03-15 ENCOUNTER — OFFICE VISIT (OUTPATIENT)
Dept: PAIN CLINIC | Facility: CLINIC | Age: 78
End: 2023-03-15
Payer: COMMERCIAL

## 2023-03-15 VITALS — HEART RATE: 50 BPM | SYSTOLIC BLOOD PRESSURE: 120 MMHG | DIASTOLIC BLOOD PRESSURE: 80 MMHG | OXYGEN SATURATION: 98 %

## 2023-03-15 DIAGNOSIS — M70.72 ISCHIAL BURSITIS OF LEFT SIDE: Primary | ICD-10-CM

## 2023-03-15 PROCEDURE — 3074F SYST BP LT 130 MM HG: CPT | Performed by: ANESTHESIOLOGY

## 2023-03-15 PROCEDURE — 99213 OFFICE O/P EST LOW 20 MIN: CPT | Performed by: ANESTHESIOLOGY

## 2023-03-15 PROCEDURE — 3079F DIAST BP 80-89 MM HG: CPT | Performed by: ANESTHESIOLOGY

## 2023-03-15 NOTE — PROGRESS NOTES
Patient presents in office today with reported pain in left hip    Current pain level reported = 0/10 pain only when he sits     Last reported dose of nothing      Narcotic Contract renewal na    Urine Drug screen na

## 2023-03-16 ENCOUNTER — TELEPHONE (OUTPATIENT)
Dept: NEUROLOGY | Facility: CLINIC | Age: 78
End: 2023-03-16

## 2023-03-16 DIAGNOSIS — M70.72 ISCHIAL BURSITIS OF LEFT SIDE: Primary | ICD-10-CM

## 2023-03-16 NOTE — TELEPHONE ENCOUNTER
Prior authorization request completed for: Left Isicial bursa injection   Authorization # NO PRIOR AUTHORIZATION / PREDETERMINATION REQUIRED  Authorization dates: N/A  CPT codes approved: 31215 / 86954   Number of visits/dates of service approved: 1  Physician: Dr. Bronson Vitale   Location: [facility] and spoke to [place] who informed me the codes are valid and billable and no PA or predetermination is needed     Call Ref#: [phone]  Representative Name: Deng Yin    Patient can be scheduled. Routed to Navigator.

## 2023-03-21 ENCOUNTER — LAB ENCOUNTER (OUTPATIENT)
Dept: LAB | Age: 78
End: 2023-03-21
Attending: FAMILY MEDICINE
Payer: MEDICARE

## 2023-03-21 DIAGNOSIS — E78.00 PURE HYPERCHOLESTEROLEMIA: Primary | ICD-10-CM

## 2023-03-21 DIAGNOSIS — Z79.899 LONG-TERM USE OF HIGH-RISK MEDICATION: ICD-10-CM

## 2023-03-21 DIAGNOSIS — E78.00 PURE HYPERCHOLESTEROLEMIA: ICD-10-CM

## 2023-03-21 LAB
ALBUMIN SERPL-MCNC: 3.4 G/DL (ref 3.4–5)
ALBUMIN/GLOB SERPL: 0.9 {RATIO} (ref 1–2)
ALP LIVER SERPL-CCNC: 66 U/L
ALT SERPL-CCNC: 41 U/L
ANION GAP SERPL CALC-SCNC: 1 MMOL/L (ref 0–18)
AST SERPL-CCNC: 24 U/L (ref 15–37)
BILIRUB SERPL-MCNC: 1.1 MG/DL (ref 0.1–2)
BUN BLD-MCNC: 18 MG/DL (ref 7–18)
CALCIUM BLD-MCNC: 9.2 MG/DL (ref 8.5–10.1)
CHLORIDE SERPL-SCNC: 108 MMOL/L (ref 98–112)
CHOLEST SERPL-MCNC: 184 MG/DL (ref ?–200)
CO2 SERPL-SCNC: 31 MMOL/L (ref 21–32)
CREAT BLD-MCNC: 0.94 MG/DL
FASTING PATIENT LIPID ANSWER: YES
FASTING STATUS PATIENT QL REPORTED: YES
GFR SERPLBLD BASED ON 1.73 SQ M-ARVRAT: 82 ML/MIN/1.73M2 (ref 60–?)
GLOBULIN PLAS-MCNC: 3.8 G/DL (ref 2.8–4.4)
GLUCOSE BLD-MCNC: 101 MG/DL (ref 70–99)
HDLC SERPL-MCNC: 67 MG/DL (ref 40–59)
LDLC SERPL CALC-MCNC: 104 MG/DL (ref ?–100)
NONHDLC SERPL-MCNC: 117 MG/DL (ref ?–130)
OSMOLALITY SERPL CALC.SUM OF ELEC: 292 MOSM/KG (ref 275–295)
POTASSIUM SERPL-SCNC: 4.7 MMOL/L (ref 3.5–5.1)
PROT SERPL-MCNC: 7.2 G/DL (ref 6.4–8.2)
SODIUM SERPL-SCNC: 140 MMOL/L (ref 136–145)
TRIGL SERPL-MCNC: 71 MG/DL (ref 30–149)
VLDLC SERPL CALC-MCNC: 12 MG/DL (ref 0–30)

## 2023-03-21 PROCEDURE — 80053 COMPREHEN METABOLIC PANEL: CPT

## 2023-03-21 PROCEDURE — 80061 LIPID PANEL: CPT

## 2023-03-21 PROCEDURE — 36415 COLL VENOUS BLD VENIPUNCTURE: CPT

## 2023-04-04 ENCOUNTER — APPOINTMENT (OUTPATIENT)
Dept: GENERAL RADIOLOGY | Facility: HOSPITAL | Age: 78
End: 2023-04-04
Attending: ANESTHESIOLOGY
Payer: MEDICARE

## 2023-04-04 ENCOUNTER — HOSPITAL ENCOUNTER (OUTPATIENT)
Facility: HOSPITAL | Age: 78
Setting detail: HOSPITAL OUTPATIENT SURGERY
Discharge: HOME | End: 2023-04-04
Attending: ANESTHESIOLOGY | Admitting: ANESTHESIOLOGY
Payer: MEDICARE

## 2023-04-04 VITALS
SYSTOLIC BLOOD PRESSURE: 133 MMHG | HEART RATE: 67 BPM | OXYGEN SATURATION: 96 % | TEMPERATURE: 98 F | RESPIRATION RATE: 18 BRPM | DIASTOLIC BLOOD PRESSURE: 71 MMHG

## 2023-04-04 PROCEDURE — 20610 DRAIN/INJ JOINT/BURSA W/O US: CPT | Performed by: ANESTHESIOLOGY

## 2023-04-04 PROCEDURE — 77002 NEEDLE LOCALIZATION BY XRAY: CPT | Performed by: ANESTHESIOLOGY

## 2023-04-04 PROCEDURE — 3E0U33Z INTRODUCTION OF ANTI-INFLAMMATORY INTO JOINTS, PERCUTANEOUS APPROACH: ICD-10-PCS | Performed by: ANESTHESIOLOGY

## 2023-04-04 PROCEDURE — 3E0U3BZ INTRODUCTION OF ANESTHETIC AGENT INTO JOINTS, PERCUTANEOUS APPROACH: ICD-10-PCS | Performed by: ANESTHESIOLOGY

## 2023-04-04 RX ORDER — LIDOCAINE HYDROCHLORIDE 10 MG/ML
INJECTION, SOLUTION EPIDURAL; INFILTRATION; INTRACAUDAL; PERINEURAL
Status: DISCONTINUED | OUTPATIENT
Start: 2023-04-04 | End: 2023-04-04

## 2023-04-04 RX ORDER — METHYLPREDNISOLONE ACETATE 40 MG/ML
INJECTION, SUSPENSION INTRA-ARTICULAR; INTRALESIONAL; INTRAMUSCULAR; SOFT TISSUE
Status: DISCONTINUED | OUTPATIENT
Start: 2023-04-04 | End: 2023-04-04

## 2023-04-04 NOTE — DISCHARGE INSTRUCTIONS
Home Care Instructions Following Your Pain Procedure     Jaja Roajs,  It has been a pleasure to have you as our patient. To help you at home, you must follow these general discharge instructions. We will review these with you before you are discharged. It is our hope that you have a complete and uneventful recovery from our procedure. General Instructions:  What to Expect:  Bandages from your procedure today can be removed when you get home. Please avoid soaking and/or swimming for 24 hours. Showering is okay  It is normal to have increased pain symptoms and/or pain at injection site for up to 3-5 days after procedure, you can use heat or ice (20 minutes on 20 minutes off) for comfort. You may experience some temporary side effects which may include restlessness or insomnia, flushing of the face, or heart palpitations. Please contact the provider if these symptoms do not resolve within 3-4 days. Lightheadedness or nausea may occur and should resolve within 24 to 48 hours. If you develop a headache after treatment, rest, drink fluids (with caffeine, if possible) and take mild over-the-counter pain medication. If the headache does not improve with the above treatment, contact the physician. Home Medications:  Resume all previously prescribed medication. Please avoid taking NSAIDs (Non-Steriodal Anti-Inflammatory Drugs) such as:  Ibuprofen ( Advil, Motrin) Aleve (Naproxen), Diclofenac, Meloxicam for 6 hours after procedure. If you are on Coumadin (Warfarin) or any other anti-coagulant (or \"blood thinning\") medication such as Plavix (Clopidogrel), Xarelto (Rivaroxaban), Eliquis (Apixaban), Effient (Prasugrel) etc., restart on the following day from the procedure unless otherwise directed by your provider. If you are a diabetic, please increase the frequency of your glucose monitoring after the procedure as steroids may cause a temporary (2-3 day) increase in your blood sugar.   Contact your primary care physician if your blood sugar remains elevated as you may require some medication adjustment. Diet:  Resume your regular diet as tolerated. Activity: We recommend that you relax and rest during the rest of your procedure day. If you feel weakness in your arms or legs do not drive. Follow-up Appointment  Please schedule a follow-up visit within 3 to 4 weeks after your last procedure date. Question or Concerns:  Feel free to call our office with any questions or concerns at [phone] (option #2)    Philipp Aggarwal  Thank you for coming to [facility] for your procedure. The nurses try very hard to make sure you receive the best care possible. Your trust in them as well as us is greatly appreciated.     Thanks so much,   Dr. Dorlene Dandy

## 2023-04-04 NOTE — OPERATIVE REPORT
[facility]  Operative Report  2023     [address] Patient Status:  Hospital Outpatient Surgery    1945 MRN [id number]   Location [id number] [place] [address] Attending Regla Vogel MD   Hosp Day # 0 PCP France Kern MD     Indication: Marco Mendez is a 76year old male with a history of ischial bursitis    Preoperative Diagnosis:  Ischial bursitis of left side [M70.72]    Postoperative Diagnosis: Same as above. Procedure performed: LEFT ISCHIAL BURSA INJECTION with local     Anesthesia: Local .    EBL: Less than 1 ml. Procedure Description:  After reviewing the patient's history and performing a focused physical examination, the diagnosis and positive previous diagnostic tests were confirmed and contraindications such as infection and coagulopathy were ruled out. Following review of allergies and potential side effects and complications, including but not necessarily limited to infection, allergic reaction, local tissue breakdown, nerve injury, and paresis, the patient consented for the procedure. The patient was brought to the procedure room in prone position. After comprehensive monitors were applied and sterile prep of the sacral region, the inferior aspect of the ischial was identified with fluoroscopy. The overlying soft tissue was then anesthetized with 5 cc of 1% lidocaine. After adequate skin analgesia, a 22-gauge spinal needle was advanced with imaging guidance to contact bone. After negative aspiration for heme, 1 cc of Omnipaque was injected which was negative for any vascular involvement. After repeat aspiration, a total mixture of 40 mg of Depo-Medrol 6 cc of 0.5% bupivacaine was injected. The needle was then withdrawn tip intact. Complications: None. Follow up: The patient was followed in the pain clinic as needed basis.           Sofiya Johnson MD

## 2023-04-05 ENCOUNTER — TELEPHONE (OUTPATIENT)
Dept: PAIN CLINIC | Facility: CLINIC | Age: 78
End: 2023-04-05

## 2023-04-05 NOTE — TELEPHONE ENCOUNTER
Joby called placed to patient for post procedure follow up. Patient stated \"im doing well\"no questions nor concerns . Informed patient that soreness is to be expected after the procedure. Educated patient that it takes 3-5 days for the steroid to be effective and to allow adequate time for medication to work. Encouraged patient to alternate ice and heat and to take medications as prescribed. Pt verbalized understanding to call with any questions or concerns.       Procedure: LEFT ISCHIAL BURSA INJECTION with   Date: 4/4/2023  Follow up Visit Scheduled: 4/19/2023 with

## 2023-04-18 ENCOUNTER — TELEPHONE (OUTPATIENT)
Dept: SURGERY | Facility: CLINIC | Age: 78
End: 2023-04-18

## 2023-04-18 NOTE — TELEPHONE ENCOUNTER
LVM for pt to call back to r/s apt 5/22/23, provider out of office. , please asssist in r/s when pt calls back.

## 2023-04-19 ENCOUNTER — OFFICE VISIT (OUTPATIENT)
Dept: PAIN CLINIC | Facility: CLINIC | Age: 78
End: 2023-04-19
Payer: COMMERCIAL

## 2023-04-19 VITALS
SYSTOLIC BLOOD PRESSURE: 142 MMHG | WEIGHT: 174 LBS | OXYGEN SATURATION: 98 % | BODY MASS INDEX: 22 KG/M2 | DIASTOLIC BLOOD PRESSURE: 68 MMHG | HEART RATE: 74 BPM

## 2023-04-19 DIAGNOSIS — G89.29 CHRONIC PAIN OF LEFT ANKLE: Primary | ICD-10-CM

## 2023-04-19 DIAGNOSIS — M25.572 CHRONIC PAIN OF LEFT ANKLE: Primary | ICD-10-CM

## 2023-04-19 DIAGNOSIS — M70.72 ISCHIAL BURSITIS OF LEFT SIDE: ICD-10-CM

## 2023-04-19 PROCEDURE — 3078F DIAST BP <80 MM HG: CPT | Performed by: ANESTHESIOLOGY

## 2023-04-19 PROCEDURE — 99214 OFFICE O/P EST MOD 30 MIN: CPT | Performed by: ANESTHESIOLOGY

## 2023-04-19 PROCEDURE — 3077F SYST BP >= 140 MM HG: CPT | Performed by: ANESTHESIOLOGY

## 2023-04-19 NOTE — PROGRESS NOTES
Last procedure: LEFT ISCHIAL BURSA INJECTION with local  Date: 04/04/23  Percentage of relief obtained: 50%  Duration of relief: current    Current Pain Score: 2-5/10

## 2023-04-21 ENCOUNTER — HOSPITAL ENCOUNTER (OUTPATIENT)
Dept: GENERAL RADIOLOGY | Age: 78
Discharge: HOME | End: 2023-04-21
Attending: ANESTHESIOLOGY
Payer: MEDICARE

## 2023-04-21 DIAGNOSIS — M25.572 CHRONIC PAIN OF LEFT ANKLE: ICD-10-CM

## 2023-04-21 DIAGNOSIS — G89.29 CHRONIC PAIN OF LEFT ANKLE: ICD-10-CM

## 2023-04-21 PROCEDURE — 73610 X-RAY EXAM OF ANKLE: CPT | Performed by: ANESTHESIOLOGY

## 2023-05-23 ENCOUNTER — TELEPHONE (OUTPATIENT)
Dept: FAMILY MEDICINE CLINIC | Facility: CLINIC | Age: 78
End: 2023-05-23

## 2023-05-23 RX ORDER — ROSUVASTATIN CALCIUM 40 MG/1
40 TABLET, COATED ORAL NIGHTLY
Qty: 90 TABLET | Refills: 0 | Status: SHIPPED | OUTPATIENT
Start: 2023-05-23

## 2023-06-21 ENCOUNTER — OFFICE VISIT (OUTPATIENT)
Dept: NEUROLOGY | Facility: CLINIC | Age: 78
End: 2023-06-21
Payer: COMMERCIAL

## 2023-06-21 VITALS
DIASTOLIC BLOOD PRESSURE: 68 MMHG | WEIGHT: 178.19 LBS | SYSTOLIC BLOOD PRESSURE: 124 MMHG | BODY MASS INDEX: 23 KG/M2 | HEART RATE: 70 BPM | RESPIRATION RATE: 16 BRPM

## 2023-06-21 DIAGNOSIS — G25.2 POSTURAL TREMOR: Primary | ICD-10-CM

## 2023-06-21 PROCEDURE — 99204 OFFICE O/P NEW MOD 45 MIN: CPT | Performed by: OTHER

## 2023-06-21 PROCEDURE — 3078F DIAST BP <80 MM HG: CPT | Performed by: OTHER

## 2023-06-21 PROCEDURE — 1159F MED LIST DOCD IN RCRD: CPT | Performed by: OTHER

## 2023-06-21 PROCEDURE — 3074F SYST BP LT 130 MM HG: CPT | Performed by: OTHER

## 2023-06-29 ENCOUNTER — APPOINTMENT (OUTPATIENT)
Dept: URBAN - METROPOLITAN AREA CLINIC 247 | Age: 78
Setting detail: DERMATOLOGY
End: 2023-06-29

## 2023-06-29 DIAGNOSIS — L24 IRRITANT CONTACT DERMATITIS: ICD-10-CM

## 2023-06-29 DIAGNOSIS — L82.1 OTHER SEBORRHEIC KERATOSIS: ICD-10-CM

## 2023-06-29 DIAGNOSIS — L57.0 ACTINIC KERATOSIS: ICD-10-CM

## 2023-06-29 DIAGNOSIS — L81.4 OTHER MELANIN HYPERPIGMENTATION: ICD-10-CM

## 2023-06-29 DIAGNOSIS — D18.0 HEMANGIOMA: ICD-10-CM

## 2023-06-29 DIAGNOSIS — D22 MELANOCYTIC NEVI: ICD-10-CM

## 2023-06-29 PROBLEM — D18.01 HEMANGIOMA OF SKIN AND SUBCUTANEOUS TISSUE: Status: ACTIVE | Noted: 2023-06-29

## 2023-06-29 PROBLEM — D22.5 MELANOCYTIC NEVI OF TRUNK: Status: ACTIVE | Noted: 2023-06-29

## 2023-06-29 PROBLEM — L30.9 DERMATITIS, UNSPECIFIED: Status: ACTIVE | Noted: 2023-06-29

## 2023-06-29 PROCEDURE — OTHER ADDITIONAL NOTES: OTHER

## 2023-06-29 PROCEDURE — 99213 OFFICE O/P EST LOW 20 MIN: CPT | Mod: 25

## 2023-06-29 PROCEDURE — 17000 DESTRUCT PREMALG LESION: CPT

## 2023-06-29 PROCEDURE — OTHER COUNSELING: OTHER

## 2023-06-29 PROCEDURE — 17003 DESTRUCT PREMALG LES 2-14: CPT

## 2023-06-29 PROCEDURE — OTHER LIQUID NITROGEN: OTHER

## 2023-06-29 ASSESSMENT — LOCATION DETAILED DESCRIPTION DERM
LOCATION DETAILED: RIGHT VENTRAL PROXIMAL FOREARM
LOCATION DETAILED: RIGHT LATERAL MALAR CHEEK
LOCATION DETAILED: LEFT SCAPHA
LOCATION DETAILED: LEFT LATERAL MALAR CHEEK
LOCATION DETAILED: RIGHT LATERAL EYEBROW
LOCATION DETAILED: RIGHT SUPERIOR FOREHEAD
LOCATION DETAILED: LEFT LATERAL FOREHEAD
LOCATION DETAILED: LEFT INFERIOR LATERAL FOREHEAD
LOCATION DETAILED: RIGHT SUPERIOR LATERAL MALAR CHEEK
LOCATION DETAILED: PERIUMBILICAL SKIN
LOCATION DETAILED: LEFT SUPERIOR UPPER BACK
LOCATION DETAILED: RIGHT MID-UPPER BACK
LOCATION DETAILED: LEFT FOREHEAD
LOCATION DETAILED: RIGHT INFERIOR UPPER BACK
LOCATION DETAILED: LEFT SUPERIOR FOREHEAD
LOCATION DETAILED: RIGHT CENTRAL MALAR CHEEK
LOCATION DETAILED: RIGHT INFERIOR CENTRAL MALAR CHEEK
LOCATION DETAILED: LEFT MEDIAL FRONTAL SCALP
LOCATION DETAILED: LEFT SUPERIOR LATERAL FOREHEAD

## 2023-06-29 ASSESSMENT — LOCATION SIMPLE DESCRIPTION DERM
LOCATION SIMPLE: RIGHT UPPER BACK
LOCATION SIMPLE: LEFT SCALP
LOCATION SIMPLE: LEFT EAR
LOCATION SIMPLE: RIGHT CHEEK
LOCATION SIMPLE: ABDOMEN
LOCATION SIMPLE: RIGHT FOREARM
LOCATION SIMPLE: LEFT FOREHEAD
LOCATION SIMPLE: LEFT UPPER BACK
LOCATION SIMPLE: RIGHT EYEBROW
LOCATION SIMPLE: RIGHT FOREHEAD
LOCATION SIMPLE: LEFT CHEEK

## 2023-06-29 ASSESSMENT — LOCATION ZONE DERM
LOCATION ZONE: TRUNK
LOCATION ZONE: FACE
LOCATION ZONE: EAR
LOCATION ZONE: SCALP
LOCATION ZONE: ARM

## 2023-06-29 NOTE — PROCEDURE: ADDITIONAL NOTES
Additional Notes: No rash noted\\nRecommended VaniCream free and clear shampoo and conditioner. Samples provided. Tried/failed clobetasol 0.05% solution and gabapentin with PCP.\\nF/u with neurologist for further E&M of possible parasthesia if no change w/ free & clear products
Detail Level: Simple
Render Risk Assessment In Note?: no

## 2023-06-29 NOTE — PROCEDURE: LIQUID NITROGEN
Detail Level: Detailed
Consent: Risks include crusting, scabbing, blistering, scarring, darker or lighter pigmentary change, recurrence, incomplete removal and infection. The patient's consent was obtained prior to treatment.
Show Aperture Variable?: Yes
Render Note In Bullet Format When Appropriate: No
Application Tool (Optional): Liquid Nitrogen Sprayer
Duration Of Freeze Thaw-Cycle (Seconds): 1
Post-Care Instructions: Apply Vaseline to treated areas several times daily until crusting resolves. Wear SPF 30+ daily and sun protective clothing to reduce pigmentation faster.
Number Of Freeze-Thaw Cycles: 1 freeze-thaw cycle

## 2023-07-13 ENCOUNTER — OFFICE VISIT (OUTPATIENT)
Dept: FAMILY MEDICINE CLINIC | Facility: CLINIC | Age: 78
End: 2023-07-13
Payer: COMMERCIAL

## 2023-07-13 ENCOUNTER — LAB ENCOUNTER (OUTPATIENT)
Dept: LAB | Age: 78
End: 2023-07-13
Attending: Other
Payer: MEDICARE

## 2023-07-13 VITALS
DIASTOLIC BLOOD PRESSURE: 64 MMHG | RESPIRATION RATE: 16 BRPM | HEIGHT: 74 IN | WEIGHT: 177 LBS | HEART RATE: 68 BPM | BODY MASS INDEX: 22.72 KG/M2 | SYSTOLIC BLOOD PRESSURE: 136 MMHG | TEMPERATURE: 98 F

## 2023-07-13 DIAGNOSIS — Z00.00 ENCOUNTER FOR ANNUAL HEALTH EXAMINATION: Primary | ICD-10-CM

## 2023-07-13 DIAGNOSIS — E78.00 PURE HYPERCHOLESTEROLEMIA: ICD-10-CM

## 2023-07-13 DIAGNOSIS — G25.2 POSTURAL TREMOR: ICD-10-CM

## 2023-07-13 DIAGNOSIS — R25.1 TREMOR OF BOTH HANDS: ICD-10-CM

## 2023-07-13 DIAGNOSIS — N40.0 BENIGN PROSTATIC HYPERPLASIA WITHOUT LOWER URINARY TRACT SYMPTOMS: ICD-10-CM

## 2023-07-13 LAB — TSI SER-ACNC: 1.01 MIU/ML (ref 0.36–3.74)

## 2023-07-13 PROCEDURE — 1159F MED LIST DOCD IN RCRD: CPT | Performed by: FAMILY MEDICINE

## 2023-07-13 PROCEDURE — 3008F BODY MASS INDEX DOCD: CPT | Performed by: FAMILY MEDICINE

## 2023-07-13 PROCEDURE — 1125F AMNT PAIN NOTED PAIN PRSNT: CPT | Performed by: FAMILY MEDICINE

## 2023-07-13 PROCEDURE — 3075F SYST BP GE 130 - 139MM HG: CPT | Performed by: FAMILY MEDICINE

## 2023-07-13 PROCEDURE — 3078F DIAST BP <80 MM HG: CPT | Performed by: FAMILY MEDICINE

## 2023-07-13 PROCEDURE — 36415 COLL VENOUS BLD VENIPUNCTURE: CPT

## 2023-07-13 PROCEDURE — G0439 PPPS, SUBSEQ VISIT: HCPCS | Performed by: FAMILY MEDICINE

## 2023-07-13 PROCEDURE — 99213 OFFICE O/P EST LOW 20 MIN: CPT | Performed by: FAMILY MEDICINE

## 2023-07-13 PROCEDURE — 1160F RVW MEDS BY RX/DR IN RCRD: CPT | Performed by: FAMILY MEDICINE

## 2023-07-13 PROCEDURE — 96160 PT-FOCUSED HLTH RISK ASSMT: CPT | Performed by: FAMILY MEDICINE

## 2023-07-13 PROCEDURE — 1170F FXNL STATUS ASSESSED: CPT | Performed by: FAMILY MEDICINE

## 2023-07-13 PROCEDURE — 84443 ASSAY THYROID STIM HORMONE: CPT

## 2023-09-06 ENCOUNTER — LAB ENCOUNTER (OUTPATIENT)
Dept: LAB | Age: 78
End: 2023-09-06
Attending: FAMILY MEDICINE
Payer: MEDICARE

## 2023-09-06 DIAGNOSIS — E78.00 PURE HYPERCHOLESTEROLEMIA: ICD-10-CM

## 2023-09-06 LAB
ALBUMIN SERPL-MCNC: 3.3 G/DL (ref 3.4–5)
ALBUMIN/GLOB SERPL: 0.9 {RATIO} (ref 1–2)
ALP LIVER SERPL-CCNC: 71 U/L
ALT SERPL-CCNC: 47 U/L
ANION GAP SERPL CALC-SCNC: 4 MMOL/L (ref 0–18)
AST SERPL-CCNC: 36 U/L (ref 15–37)
BILIRUB SERPL-MCNC: 1.3 MG/DL (ref 0.1–2)
BUN BLD-MCNC: 19 MG/DL (ref 7–18)
CALCIUM BLD-MCNC: 8.6 MG/DL (ref 8.5–10.1)
CHLORIDE SERPL-SCNC: 109 MMOL/L (ref 98–112)
CHOLEST SERPL-MCNC: 171 MG/DL (ref ?–200)
CO2 SERPL-SCNC: 28 MMOL/L (ref 21–32)
CREAT BLD-MCNC: 1.09 MG/DL
EGFRCR SERPLBLD CKD-EPI 2021: 69 ML/MIN/1.73M2 (ref 60–?)
FASTING PATIENT LIPID ANSWER: YES
FASTING STATUS PATIENT QL REPORTED: YES
GLOBULIN PLAS-MCNC: 3.7 G/DL (ref 2.8–4.4)
GLUCOSE BLD-MCNC: 100 MG/DL (ref 70–99)
HDLC SERPL-MCNC: 59 MG/DL (ref 40–59)
LDLC SERPL CALC-MCNC: 100 MG/DL (ref ?–100)
NONHDLC SERPL-MCNC: 112 MG/DL (ref ?–130)
OSMOLALITY SERPL CALC.SUM OF ELEC: 294 MOSM/KG (ref 275–295)
POTASSIUM SERPL-SCNC: 4.1 MMOL/L (ref 3.5–5.1)
PROT SERPL-MCNC: 7 G/DL (ref 6.4–8.2)
SODIUM SERPL-SCNC: 141 MMOL/L (ref 136–145)
TRIGL SERPL-MCNC: 60 MG/DL (ref 30–149)
VLDLC SERPL CALC-MCNC: 10 MG/DL (ref 0–30)

## 2023-09-06 PROCEDURE — 80061 LIPID PANEL: CPT

## 2023-09-06 PROCEDURE — 80053 COMPREHEN METABOLIC PANEL: CPT

## 2023-09-06 PROCEDURE — 36415 COLL VENOUS BLD VENIPUNCTURE: CPT

## 2023-09-06 NOTE — TELEPHONE ENCOUNTER
Pt requesting rx refill for:  rosuvastatin 40 MG Oral Tab 90 tablet 0 5/23/2023       Rx to:  [id number] - [facility] - [address] [place] [address] Dr CINDY CHINO AT THE [address], [phone], [phone]

## 2023-09-07 RX ORDER — ROSUVASTATIN CALCIUM 40 MG/1
40 TABLET, COATED ORAL NIGHTLY
Qty: 90 TABLET | Refills: 1 | Status: SHIPPED | OUTPATIENT
Start: 2023-09-07

## 2023-12-08 NOTE — TELEPHONE ENCOUNTER
LOV: 7/13/23  Next OV: Return in 6 months (on 1/13/2024). Last Refill: 9/7/23      Please authorize if acceptable. Thank you!

## 2023-12-13 RX ORDER — ROSUVASTATIN CALCIUM 40 MG/1
40 TABLET, COATED ORAL NIGHTLY
Qty: 90 TABLET | Refills: 0 | Status: SHIPPED | OUTPATIENT
Start: 2023-12-13

## 2024-04-01 DIAGNOSIS — L21.0 DANDRUFF IN ADULT: ICD-10-CM

## 2024-04-01 RX ORDER — CLOBETASOL PROPIONATE 0.46 MG/ML
1 SOLUTION TOPICAL NIGHTLY
Qty: 50 ML | Refills: 0 | Status: SHIPPED | OUTPATIENT
Start: 2024-04-01

## 2024-04-01 NOTE — TELEPHONE ENCOUNTER
Last refill:   Clobetasol Propionate 0.05 % External Solution 50 mL 2 3/8/2022 --   Sig:   Apply 1 mL topically nightly.       LOV: 7/13/23  Next OV: 4/4/24    Ok for refill or needs to wait for upcoming visit 4/4/24?

## 2024-04-01 NOTE — TELEPHONE ENCOUNTER
Requesting:  Clobetasol Propionate 0.05 % External Solution    To:  [id number] [address] AT [facility] [address], [phone], [phone]    Advised needs to schedule apt to est care w/ Dr Nunez before refills, pt questions why. Explained needs to establish w/ new PCP as has not seen anyone w/ Dr Pretty.    Scheduled 4/4/24 med check.

## 2024-04-04 ENCOUNTER — OFFICE VISIT (OUTPATIENT)
Dept: FAMILY MEDICINE CLINIC | Facility: CLINIC | Age: 79
End: 2024-04-04
Payer: COMMERCIAL

## 2024-04-04 VITALS
RESPIRATION RATE: 16 BRPM | SYSTOLIC BLOOD PRESSURE: 130 MMHG | BODY MASS INDEX: 23.23 KG/M2 | TEMPERATURE: 97 F | HEART RATE: 64 BPM | WEIGHT: 181 LBS | HEIGHT: 74 IN | DIASTOLIC BLOOD PRESSURE: 60 MMHG

## 2024-04-04 DIAGNOSIS — E78.00 PURE HYPERCHOLESTEROLEMIA: Primary | ICD-10-CM

## 2024-04-04 DIAGNOSIS — N40.1 BENIGN PROSTATIC HYPERPLASIA WITH NOCTURIA: ICD-10-CM

## 2024-04-04 DIAGNOSIS — R35.1 BENIGN PROSTATIC HYPERPLASIA WITH NOCTURIA: ICD-10-CM

## 2024-04-04 NOTE — PROGRESS NOTES
[facility] Office Note  Chief Complaint:   Chief Complaint   Patient presents with    [facility]       HPI:   This is a 78 year old male coming in for medication review.  The patient states that he has been doing well with his rosuvastatin.  Denies any acute concerns with it.  Denies any chest pain nausea vomiting diarrhea constipation.      Past Medical History:   Diagnosis Date    Cancer of skin, face     Chest pain     High cholesterol     Thyroid nodule     Visual impairment     glasses     Past Surgical History:   Procedure Laterality Date    CHOLECYSTECTOMY  11    BY Dr. Lord @ [place]    HERNIA SURGERY      OTHER SURGICAL HISTORY      HEEL    REMOVAL GALLBLADDER      TONSILLECTOMY       Social History:  Social History     Socioeconomic History    Marital status:    Tobacco Use    Smoking status: Former     Packs/day: 1.00     Years: 7.00     Additional pack years: 0.00     Total pack years: 7.00     Types: Cigarettes     Quit date: 1968     Years since quittin.2    Smokeless tobacco: Never   Vaping Use    Vaping Use: Never used   Substance and Sexual Activity    Alcohol use: Yes     Comment: 6 drinks a week     Drug use: No   Other Topics Concern    Caffeine Concern No    Exercise Yes     Comment: stretching 20 min every other day      Family History:  Family History   Problem Relation Age of Onset    Cancer Father         BLADDER    Stroke Mother     Cancer Sister         Thyroid     Allergies:  No Known Allergies  Current Meds:  Current Outpatient Medications   Medication Sig Dispense Refill    clobetasol 0.05 % External Solution Apply 1 mL topically nightly. 50 mL 0    rosuvastatin 40 MG Oral Tab Take 1 tablet (40 mg total) by mouth nightly. 90 tablet 0    Cholecalciferol (VITAMIN D) 1000 UNITS Oral Cap Take 2,000 Units by mouth daily.      Omega-3 Fatty Acids (FISH OIL) 1000 MG Oral Cap Take  by mouth daily.      Multiple Vitamins-Minerals  (CENTRUM SILVER) Oral Tab Take 1 Tab by mouth daily.      Coenzyme Q10 (COQ10) 100 MG Oral Cap Take 1 capsule by mouth daily.        Counseling given: Not Answered       REVIEW OF SYSTEMS:   Consitutional: No fevers, chills, sweats  Eye: No recent visual problems  ENMT: No ear pain nasal congestion sore throat  Respiratory: No shortness of breath, cough  Cardiovascular: No chest pain palpitations syncope  Gastrointestinal: No nausea vomiting diarrhea  Genitourinary: No hematuria  Hema/Lymph no bruising tendency, swollen lymph glands  Endocrine: Negative for excessive thirst excessive hunger  Musculoskeletal: No back pain neck pain joint pain muscle pain decreased range of motion  Integumentary: No rash, pruritus, abrasions  Neurologic: Alert and oriented x4  Psychiatric: No anxiety, depression    Medical, surgical, family, and social histories were reviewed      EXAM:   VITALS:   Vitals:    04/04/24 1045   BP: 130/60   Pulse: 64   Resp: 16   Temp: 97.4 °F (36.3 °C)      GENERAL: well developed, well nourished, in no apparent distress  SKIN: no rashes, no suspicious lesions: Cool and Dry  HEENT: atraumatic, normocephalic, ears and throat are clear    Ears: TM's clear and visible bilaterally, no excess cerumen or erythema.   EYES: Pupils equal round and reactive.  Extraocular motions intact no scleral icterus no injection or drainage  THROAT without erythema tonsillar hypertrophy or exudate.  Uvula midline airway patent  NECK: Given midline.  No JVD or lymphadenopathy supple nontender no meningeal signs   LUNGS: clear to auscultation sounds equal bilaterally no wheezes rales or rhonchi  CARDIO: Regular rate and rhythm without murmurs gallops or rubs  GI: Soft nontender nondistended no hepatomegaly palpable masses.  No guarding.   without deformity or crepitus no flank tenderness  EXTREMITIES: no cyanosis, clubbing or edema no joint tenderness effusion or edema noted.  No calf tenderness negative Homans'  sign bilaterally  NEURO: Awake and alert.  Cranial nerves II through XII intact motor and sensory grossly within normal limits.  5 out of 5 muscle strength in all muscle groups.  Normal speech.  PSYCHIATRIC: Awake, alert and oriented x3, cooperative appropriate mood and affect.  Judgment intact       ASSESSMENT AND PLAN:   1. Pure hypercholesterolemia  - Comp Metabolic Panel (14); Future  - Lipid Panel; Future  I did discuss with the patient at this time I would suggest updating blood work you need a CMP as well as lipid panel he was asked to continue to take his rosuvastatin.  As well as to watch his diet  2. Benign prostatic hyperplasia with nocturia  - PSA Total, Diagnostic; Future  Did discuss with the patient we will be updating a PSA      Meds & Refills for this Visit:  Requested Prescriptions      No prescriptions requested or ordered in this encounter       Health Maintenance:  Health Maintenance Due   Topic Date Due    COVID-19 Vaccine (5 - 2023-24 season) 09/01/2023    MA Annual Health Assessment  01/01/2024    Annual Depression Screening  01/01/2024    Fall Risk Screening (Annual)  01/01/2024       Patient/Caregiver Education: Patient/Caregiver Education: There are no barriers to learning. Medical education done.   Outcome: Patient verbalizes understanding. Patient is notified to call with any questions, complications, allergies, or worsening or changing symptoms.  Patient is to call with any side effects or complications from the treatments as a result of today.     Problem List:  Patient Active Problem List   Diagnosis    Disturbance of skin sensation    Pure hypercholesterolemia    Multinodular thyroid    Erectile dysfunction    Arthrosis of right acromioclavicular joint    Rotator cuff syndrome of right shoulder    Chronic pain of both hips    Tremors of nervous system    Ischial bursitis of left side         No follow-ups on file.     Emery Nunez MD    Please note that portions of this note  may have been completed with a voice recognition program. Efforts were made to edit the dictations but occasionally words are mis-transcribed.

## 2024-04-11 ENCOUNTER — LAB ENCOUNTER (OUTPATIENT)
Dept: LAB | Age: 79
End: 2024-04-11
Attending: FAMILY MEDICINE
Payer: MEDICARE

## 2024-04-11 DIAGNOSIS — N40.1 BENIGN PROSTATIC HYPERPLASIA WITH NOCTURIA: ICD-10-CM

## 2024-04-11 DIAGNOSIS — R35.1 BENIGN PROSTATIC HYPERPLASIA WITH NOCTURIA: ICD-10-CM

## 2024-04-11 DIAGNOSIS — E78.00 PURE HYPERCHOLESTEROLEMIA: ICD-10-CM

## 2024-04-11 LAB
ALBUMIN SERPL-MCNC: 4.1 G/DL (ref 3.2–4.8)
ALBUMIN/GLOB SERPL: 1.5 {RATIO} (ref 1–2)
ALP LIVER SERPL-CCNC: 67 U/L
ALT SERPL-CCNC: 25 U/L
ANION GAP SERPL CALC-SCNC: 1 MMOL/L (ref 0–18)
AST SERPL-CCNC: 23 U/L (ref ?–34)
BILIRUB SERPL-MCNC: 1.1 MG/DL (ref 0.2–1.1)
BUN BLD-MCNC: 19 MG/DL (ref 9–23)
BUN/CREAT SERPL: 17.8 (ref 10–20)
CALCIUM BLD-MCNC: 9.4 MG/DL (ref 8.7–10.4)
CHLORIDE SERPL-SCNC: 108 MMOL/L (ref 98–112)
CHOLEST SERPL-MCNC: 189 MG/DL (ref ?–200)
CO2 SERPL-SCNC: 32 MMOL/L (ref 21–32)
CREAT BLD-MCNC: 1.07 MG/DL
EGFRCR SERPLBLD CKD-EPI 2021: 70 ML/MIN/1.73M2 (ref 60–?)
FASTING PATIENT LIPID ANSWER: YES
FASTING STATUS PATIENT QL REPORTED: YES
GLOBULIN PLAS-MCNC: 2.7 G/DL (ref 2.8–4.4)
GLUCOSE BLD-MCNC: 91 MG/DL (ref 70–99)
HDLC SERPL-MCNC: 61 MG/DL (ref 40–59)
LDLC SERPL CALC-MCNC: 116 MG/DL (ref ?–100)
NONHDLC SERPL-MCNC: 128 MG/DL (ref ?–130)
OSMOLALITY SERPL CALC.SUM OF ELEC: 294 MOSM/KG (ref 275–295)
POTASSIUM SERPL-SCNC: 4.5 MMOL/L (ref 3.5–5.1)
PROT SERPL-MCNC: 6.8 G/DL (ref 5.7–8.2)
PSA SERPL-MCNC: 1.88 NG/ML (ref ?–4)
SODIUM SERPL-SCNC: 141 MMOL/L (ref 136–145)
TRIGL SERPL-MCNC: 65 MG/DL (ref 30–149)
VLDLC SERPL CALC-MCNC: 11 MG/DL (ref 0–30)

## 2024-04-11 PROCEDURE — 36415 COLL VENOUS BLD VENIPUNCTURE: CPT

## 2024-04-11 PROCEDURE — 84153 ASSAY OF PSA TOTAL: CPT

## 2024-04-11 PROCEDURE — 80061 LIPID PANEL: CPT

## 2024-04-11 PROCEDURE — 80053 COMPREHEN METABOLIC PANEL: CPT

## 2024-04-18 DIAGNOSIS — E78.00 PURE HYPERCHOLESTEROLEMIA: Primary | ICD-10-CM

## 2024-06-20 RX ORDER — ROSUVASTATIN CALCIUM 40 MG/1
40 TABLET, COATED ORAL NIGHTLY
Qty: 90 TABLET | Refills: 0 | Status: SHIPPED | OUTPATIENT
Start: 2024-06-20

## 2024-07-24 ENCOUNTER — APPOINTMENT (OUTPATIENT)
Dept: URBAN - METROPOLITAN AREA CLINIC 247 | Age: 79
Setting detail: DERMATOLOGY
End: 2024-07-24

## 2024-07-24 DIAGNOSIS — L81.0 POSTINFLAMMATORY HYPERPIGMENTATION: ICD-10-CM

## 2024-07-24 DIAGNOSIS — L82.1 OTHER SEBORRHEIC KERATOSIS: ICD-10-CM

## 2024-07-24 DIAGNOSIS — L81.4 OTHER MELANIN HYPERPIGMENTATION: ICD-10-CM

## 2024-07-24 DIAGNOSIS — D22 MELANOCYTIC NEVI: ICD-10-CM

## 2024-07-24 DIAGNOSIS — D18.0 HEMANGIOMA: ICD-10-CM

## 2024-07-24 DIAGNOSIS — L57.0 ACTINIC KERATOSIS: ICD-10-CM

## 2024-07-24 PROBLEM — D22.5 MELANOCYTIC NEVI OF TRUNK: Status: ACTIVE | Noted: 2024-07-24

## 2024-07-24 PROBLEM — D18.01 HEMANGIOMA OF SKIN AND SUBCUTANEOUS TISSUE: Status: ACTIVE | Noted: 2024-07-24

## 2024-07-24 PROCEDURE — OTHER LIQUID NITROGEN: OTHER

## 2024-07-24 PROCEDURE — OTHER COUNSELING: OTHER

## 2024-07-24 PROCEDURE — 99213 OFFICE O/P EST LOW 20 MIN: CPT | Mod: 25

## 2024-07-24 PROCEDURE — OTHER MIPS QUALITY: OTHER

## 2024-07-24 PROCEDURE — 17004 DESTROY PREMAL LESIONS 15/>: CPT

## 2024-07-24 ASSESSMENT — LOCATION DETAILED DESCRIPTION DERM
LOCATION DETAILED: RIGHT INFERIOR TEMPLE
LOCATION DETAILED: PERIUMBILICAL SKIN
LOCATION DETAILED: RIGHT INFERIOR LATERAL FOREHEAD
LOCATION DETAILED: RIGHT MEDIAL UPPER BACK
LOCATION DETAILED: LEFT FOREHEAD
LOCATION DETAILED: LEFT LATERAL FOREHEAD
LOCATION DETAILED: RIGHT DISTAL PRETIBIAL REGION
LOCATION DETAILED: RIGHT INFERIOR FOREHEAD
LOCATION DETAILED: LEFT SUPERIOR MEDIAL FOREHEAD
LOCATION DETAILED: LEFT SUPERIOR MEDIAL UPPER BACK
LOCATION DETAILED: RIGHT INFERIOR UPPER BACK
LOCATION DETAILED: RIGHT SUPERIOR LATERAL MALAR CHEEK
LOCATION DETAILED: LEFT INFERIOR CENTRAL MALAR CHEEK
LOCATION DETAILED: LEFT SUPERIOR FOREHEAD
LOCATION DETAILED: LEFT SUPERIOR LATERAL FOREHEAD
LOCATION DETAILED: RIGHT LATERAL FOREHEAD
LOCATION DETAILED: RIGHT INFERIOR CENTRAL MALAR CHEEK
LOCATION DETAILED: RIGHT MID-UPPER BACK
LOCATION DETAILED: RIGHT FOREHEAD
LOCATION DETAILED: LEFT CENTRAL MALAR CHEEK
LOCATION DETAILED: LEFT SUPERIOR UPPER BACK
LOCATION DETAILED: LEFT SUPERIOR LATERAL MALAR CHEEK
LOCATION DETAILED: RIGHT LATERAL MALAR CHEEK

## 2024-07-24 ASSESSMENT — LOCATION SIMPLE DESCRIPTION DERM
LOCATION SIMPLE: RIGHT TEMPLE
LOCATION SIMPLE: LEFT UPPER BACK
LOCATION SIMPLE: RIGHT CHEEK
LOCATION SIMPLE: ABDOMEN
LOCATION SIMPLE: LEFT CHEEK
LOCATION SIMPLE: RIGHT UPPER BACK
LOCATION SIMPLE: RIGHT FOREHEAD
LOCATION SIMPLE: LEFT FOREHEAD
LOCATION SIMPLE: RIGHT PRETIBIAL REGION

## 2024-07-24 ASSESSMENT — LOCATION ZONE DERM
LOCATION ZONE: LEG
LOCATION ZONE: FACE
LOCATION ZONE: TRUNK

## 2024-07-24 NOTE — PROCEDURE: LIQUID NITROGEN
Consent: Risks include crusting, scabbing, blistering, scarring, darker or lighter pigmentary change, recurrence, incomplete removal and infection. The patient's consent was obtained prior to treatment.
Show Aperture Variable?: Yes
Application Tool (Optional): Liquid Nitrogen Sprayer
Duration Of Freeze Thaw-Cycle (Seconds): 1
Post-Care Instructions: Apply Vaseline to treated areas several times daily until crusting resolves. Wear SPF 30+ daily and sun protective clothing to reduce pigmentation faster.
Number Of Freeze-Thaw Cycles: 1 freeze-thaw cycle
Render Post-Care Instructions In Note?: no
Detail Level: Detailed

## 2024-08-30 RX ORDER — ROSUVASTATIN CALCIUM 40 MG/1
40 TABLET, COATED ORAL NIGHTLY
Qty: 90 TABLET | Refills: 0 | Status: SHIPPED | OUTPATIENT
Start: 2024-08-30

## 2024-09-23 ENCOUNTER — LAB ENCOUNTER (OUTPATIENT)
Dept: LAB | Age: 79
End: 2024-09-23
Attending: FAMILY MEDICINE
Payer: MEDICARE

## 2024-09-23 DIAGNOSIS — E78.00 PURE HYPERCHOLESTEROLEMIA: ICD-10-CM

## 2024-09-23 LAB
ALBUMIN SERPL-MCNC: 3.8 G/DL (ref 3.2–4.8)
ALBUMIN/GLOB SERPL: 1.2 {RATIO} (ref 1–2)
ALP LIVER SERPL-CCNC: 63 U/L
ALT SERPL-CCNC: 21 U/L
ANION GAP SERPL CALC-SCNC: 6 MMOL/L (ref 0–18)
AST SERPL-CCNC: 19 U/L (ref ?–34)
BILIRUB SERPL-MCNC: 1.2 MG/DL (ref 0.2–1.1)
BUN BLD-MCNC: 15 MG/DL (ref 9–23)
CALCIUM BLD-MCNC: 9.6 MG/DL (ref 8.7–10.4)
CHLORIDE SERPL-SCNC: 105 MMOL/L (ref 98–112)
CHOLEST SERPL-MCNC: 173 MG/DL (ref ?–200)
CO2 SERPL-SCNC: 28 MMOL/L (ref 21–32)
CREAT BLD-MCNC: 1.05 MG/DL
EGFRCR SERPLBLD CKD-EPI 2021: 72 ML/MIN/1.73M2 (ref 60–?)
FASTING PATIENT LIPID ANSWER: YES
FASTING STATUS PATIENT QL REPORTED: YES
GLOBULIN PLAS-MCNC: 3.1 G/DL (ref 2–3.5)
GLUCOSE BLD-MCNC: 99 MG/DL (ref 70–99)
HDLC SERPL-MCNC: 59 MG/DL (ref 40–59)
LDLC SERPL CALC-MCNC: 101 MG/DL (ref ?–100)
NONHDLC SERPL-MCNC: 114 MG/DL (ref ?–130)
OSMOLALITY SERPL CALC.SUM OF ELEC: 289 MOSM/KG (ref 275–295)
POTASSIUM SERPL-SCNC: 4.4 MMOL/L (ref 3.5–5.1)
PROT SERPL-MCNC: 6.9 G/DL (ref 5.7–8.2)
SODIUM SERPL-SCNC: 139 MMOL/L (ref 136–145)
TRIGL SERPL-MCNC: 65 MG/DL (ref 30–149)
VLDLC SERPL CALC-MCNC: 11 MG/DL (ref 0–30)

## 2024-09-23 PROCEDURE — 80053 COMPREHEN METABOLIC PANEL: CPT

## 2024-09-23 PROCEDURE — 80061 LIPID PANEL: CPT

## 2024-09-23 PROCEDURE — 36415 COLL VENOUS BLD VENIPUNCTURE: CPT

## 2024-09-27 ENCOUNTER — OFFICE VISIT (OUTPATIENT)
Dept: FAMILY MEDICINE CLINIC | Facility: CLINIC | Age: 79
End: 2024-09-27
Payer: COMMERCIAL

## 2024-09-27 VITALS
WEIGHT: 177 LBS | TEMPERATURE: 97 F | HEIGHT: 74 IN | SYSTOLIC BLOOD PRESSURE: 128 MMHG | BODY MASS INDEX: 22.72 KG/M2 | DIASTOLIC BLOOD PRESSURE: 62 MMHG | RESPIRATION RATE: 18 BRPM | HEART RATE: 70 BPM

## 2024-09-27 DIAGNOSIS — Z00.00 MEDICARE ANNUAL WELLNESS VISIT, SUBSEQUENT: Primary | ICD-10-CM

## 2024-09-27 DIAGNOSIS — E78.00 PURE HYPERCHOLESTEROLEMIA: ICD-10-CM

## 2024-09-27 PROCEDURE — 1170F FXNL STATUS ASSESSED: CPT | Performed by: FAMILY MEDICINE

## 2024-09-27 PROCEDURE — 1160F RVW MEDS BY RX/DR IN RCRD: CPT | Performed by: FAMILY MEDICINE

## 2024-09-27 PROCEDURE — 3078F DIAST BP <80 MM HG: CPT | Performed by: FAMILY MEDICINE

## 2024-09-27 PROCEDURE — 1126F AMNT PAIN NOTED NONE PRSNT: CPT | Performed by: FAMILY MEDICINE

## 2024-09-27 PROCEDURE — 3008F BODY MASS INDEX DOCD: CPT | Performed by: FAMILY MEDICINE

## 2024-09-27 PROCEDURE — G0439 PPPS, SUBSEQ VISIT: HCPCS | Performed by: FAMILY MEDICINE

## 2024-09-27 PROCEDURE — 3074F SYST BP LT 130 MM HG: CPT | Performed by: FAMILY MEDICINE

## 2024-09-27 PROCEDURE — 96160 PT-FOCUSED HLTH RISK ASSMT: CPT | Performed by: FAMILY MEDICINE

## 2024-09-27 PROCEDURE — 1159F MED LIST DOCD IN RCRD: CPT | Performed by: FAMILY MEDICINE

## 2024-09-27 NOTE — PROGRESS NOTES
Subjective:   Hever Curtis is a 79 year old male who presents for a MA AHA (Medicare Advantage Annual Health Assessment) and Subsequent Annual Wellness visit (Pt already had Initial Annual Wellness) and scheduled follow up of multiple significant but stable problems.       Patient has a past medical history of hypercholesterolemia patient states that he has been taking his medications as prescribed he denies any acute issues today.  Denies any chest pain nausea vomiting diarrhea constipation.        History/Other:   Fall Risk Assessment:   He has been screened for Falls and is low risk.      Cognitive Assessment:   He had a completely normal cognitive assessment - see flowsheet entries     Functional Ability/Status:   Hever Curtsi has a completely normal functional assessment. See flowsheet for details.      Depression Screening (PHQ):  PHQ-2 SCORE: 0  , done 9/27/2024          Advanced Directives:   He does have a Living Will but we do NOT have it on file in Epic.    He does have a POA but we do NOT have it on file in Epic.    Discussed Advance Care Planning with patient (and family/surrogate if present). Standard forms made available to patient in After Visit Summary.      Patient Active Problem List   Diagnosis    Disturbance of skin sensation    Pure hypercholesterolemia    Multinodular thyroid    Erectile dysfunction    Arthrosis of right acromioclavicular joint    Rotator cuff syndrome of right shoulder    Chronic pain of both hips    Tremors of nervous system    Ischial bursitis of left side     Allergies:  He has No Known Allergies.    Current Medications:  Outpatient Medications Marked as Taking for the 9/27/24 encounter (Office Visit) with Emery Nunez MD   Medication Sig    ROSUVASTATIN 40 MG Oral Tab TAKE 1 TABLET(40 MG) BY MOUTH EVERY NIGHT    clobetasol 0.05 % External Solution Apply 1 mL topically nightly.    Cholecalciferol (VITAMIN D) 1000 UNITS Oral Cap Take 2,000 Units by  mouth daily.    Omega-3 Fatty Acids (FISH OIL) 1000 MG Oral Cap Take  by mouth daily.    Multiple Vitamins-Minerals (CENTRUM SILVER) Oral Tab Take 1 Tab by mouth daily.    Coenzyme Q10 (COQ10) 100 MG Oral Cap Take 1 capsule by mouth daily.       Medical History:  He  has a past medical history of Cancer of skin, face, Chest pain, High cholesterol, Thyroid nodule, and Visual impairment.  Surgical History:  He  has a past surgical history that includes tonsillectomy (); hernia surgery (); other surgical history (); cholecystectomy (11); and removal gallbladder.   Family History:  His family history includes Cancer in his father and sister; Stroke in his mother.  Social History:  He  reports that he quit smoking about 56 years ago. His smoking use included cigarettes. He started smoking about 63 years ago. He has a 7 pack-year smoking history. He has never used smokeless tobacco. He reports current alcohol use. He reports that he does not use drugs.    Tobacco:  He smoked tobacco in the past but quit greater than 12 months ago.  Social History     Tobacco Use   Smoking Status Former    Current packs/day: 0.00    Average packs/day: 1 pack/day for 7.0 years (7.0 ttl pk-yrs)    Types: Cigarettes    Start date: 1961    Quit date: 1968    Years since quittin.7   Smokeless Tobacco Never        CAGE Alcohol Screen:   CAGE screening score of 0 on 2024, showing low risk of alcohol abuse.      Patient Care Team:  Emery Nunez MD as PCP - General  Elsie Dow MD as Consulting Physician (NEUROLOGY)  Fatoumata Lipscomb PT as Physical Therapist  Juju Soto PT as Physical Therapist  Grazyna Wayne PT, DPT, Cert.MDT as Physical Therapist (Physical Therapy)  Angie Leonard DO as Consulting Physician (NEUROLOGY)    Review of Systems  Consitutional: No fevers, chills, sweats  Eye: No recent visual problems  ENMT: No ear pain nasal congestion sore throat  Respiratory: No  shortness of breath, cough  Cardiovascular: No chest pain palpitations syncope  Gastrointestinal: No nausea vomiting diarrhea  Genitourinary: No hematuria  Hema/Lymph no bruising tendency, swollen lymph glands  Endocrine: Negative for excessive thirst excessive hunger  Musculoskeletal: No back pain neck pain joint pain muscle pain decreased range of motion  Integumentary: No rash, pruritus, abrasions  Neurologic: Alert and oriented x4  Psychiatric: No anxiety, depression    Medical, surgical, family, and social histories were reviewed      Objective:   Physical Exam    VITALS: Vitals reviewed   GENERAL: well developed, well nourished, in no apparent distress  SKIN: no rashes, no suspicious lesions: Cool and Dry  HEENT: atraumatic, normocephalic, ears and throat are clear bilateral tympanic membranes lucent nonbulging intact nose without bleeding purulent discharge or septal hematoma.    EYES: Pupils equal round and reactive.  Extraocular motions intact no scleral icterus no injection or drainage  THROAT without erythema tonsillar hypertrophy or exudate.  Uvula midline airway patent                Hearing Assessed via: Whispered Voice  NECK: trachea midline.  No JVD or lymphadenopathy supple nontender no meningeal signs   LUNGS: clear to auscultation sounds equal bilaterally no wheezes rales or rhonchi  CARDIO: Regular rate and rhythm without murmurs gallops or rubs  GI: Soft nontender nondistended no hepatomegaly palpable masses.  No guarding.   without deformity or crepitus no flank tenderness  EXTREMITIES: no cyanosis, clubbing or edema no joint tenderness effusion or edema noted.  No calf tenderness negative Homans' sign bilaterally  NEURO: Awake and alert.  Cranial nerves II through XII intact motor and sensory grossly within normal limits.  5 out of 5 muscle strength in all muscle groups.  Normal speech.  PSYCHIATRIC: Awake, alert and oriented x3, cooperative appropriate mood and affect.  Judgment  intact          /62 (BP Location: Left arm, Patient Position: Sitting, Cuff Size: adult)   Pulse 70   Temp 97.3 °F (36.3 °C) (Temporal)   Resp 18   Ht 6' 2\" (1.88 m)   Wt 177 lb (80.3 kg)   BMI 22.73 kg/m²  Estimated body mass index is 22.73 kg/m² as calculated from the following:    Height as of this encounter: 6' 2\" (1.88 m).    Weight as of this encounter: 177 lb (80.3 kg).    Medicare Hearing Assessment:   Hearing Screening    Time taken: 9/27/2024 10:40 AM  Entry User: Rony Benitez MA  Screening Method: Finger Rub  Finger Rub Result: Pass         Visual Acuity:   Right Eye Visual Acuity: Corrected Right Eye Chart Acuity: 20/40   Left Eye Visual Acuity: Corrected Left Eye Chart Acuity: 20/40   Both Eyes Visual Acuity: Corrected Both Eyes Chart Acuity: 20/30   Able To Tolerate Visual Acuity: Yes        Assessment & Plan:   Hever Curtis is a 79 year old male who presents for a Medicare Assessment.     1. Medicare annual wellness visit, subsequent (Primary)  I did discuss with the patient at this time to update his immunizations he was asked to have a COVID-19 vaccine as well as flu vaccine.  Was asked to ensure that he supplies us with his medical power of .  Was asked to follow-up yearly for regular physical exams or for any other acute concern.        2. Pure hypercholesterolemia  The patient is currently on rosuvastatin he was asked to continue to take the medication as prescribed his LDL has improved he was asked to watch his fatty food fried food intake        The patient presented today for Medicare annual wellness visit.  During the course of today's visit the health risk assessment past medical family and social histories were updated and reviewed with the patient.  The patient was educated and counseled about appropriate screening and preventative services and these were ordered as appropriate.  Referrals for educational and counseling services were made where indicated.   Advance directives were discussed.  A copy of the recommended preventative screenings as well as discharge instructions were provided to the patient.  End-of-life planning was discussed advanced directives were also discussed and are in place.    The patient indicates understanding of these issues and agrees to the plan.  Lab work ordered.  Patient reassured.  Reinforced healthy diet, lifestyle, and exercise.      No follow-ups on file.     Emery Nunez MD, 9/27/2024     Supplementary Documentation:   General Health:  In the past six months, have you lost more than 10 pounds without trying?: 2 - No  Has your appetite been poor?: No  Type of Diet: Balanced  How does the patient maintain a good energy level?: Other (Golfing)  How would you describe your daily physical activity?: Light  How would you describe your current health state?: Good  How do you maintain positive mental well-being?: Visiting Friends;Games;Puzzles;Social Interaction;Visiting Family (Golfing)  On a scale of 0 to 10, with 0 being no pain and 10 being severe pain, what is your pain level?: 0 - (None)  In the past six months, have you experienced urine leakage?: 0-No  At any time do you feel concerned for the safety/well-being of yourself and/or your children, in your home or elsewhere?: No  Have you had any immunizations at another office such as Influenza, Hepatitis B, Tetanus, or Pneumococcal?: No    Health Maintenance   Topic Date Due    MA Annual Health Assessment  01/01/2024    COVID-19 Vaccine (8 - 2023-24 season) 09/01/2024    Influenza Vaccine (1) 10/01/2024    Annual Depression Screening  Completed    Fall Risk Screening (Annual)  Completed    Pneumococcal Vaccine: 65+ Years  Completed    Zoster Vaccines  Completed

## 2024-11-26 DIAGNOSIS — L21.0 DANDRUFF IN ADULT: ICD-10-CM

## 2024-11-26 NOTE — TELEPHONE ENCOUNTER
LOV:9/27/2024   for: MA Supervisit   Patient advised to RTC on:  None stated    Medication Quantity Refills Start End   clobetasol 0.05 % External Solution 50 mL 0 4/1/2024 --   Sig:   Apply 1 mL topically nightly.

## 2024-11-27 RX ORDER — CLOBETASOL PROPIONATE 0.5 MG/ML
SOLUTION TOPICAL
Qty: 50 ML | Refills: 0 | Status: SHIPPED | OUTPATIENT
Start: 2024-11-27

## 2024-12-23 ENCOUNTER — TELEPHONE (OUTPATIENT)
Dept: FAMILY MEDICINE CLINIC | Facility: CLINIC | Age: 79
End: 2024-12-23

## 2024-12-23 DIAGNOSIS — E78.00 PURE HYPERCHOLESTEROLEMIA: Primary | ICD-10-CM

## 2024-12-23 RX ORDER — ROSUVASTATIN CALCIUM 40 MG/1
40 TABLET, COATED ORAL NIGHTLY
Qty: 90 TABLET | Refills: 0 | Status: SHIPPED | OUTPATIENT
Start: 2024-12-23

## 2024-12-23 NOTE — TELEPHONE ENCOUNTER
Patient called his refill for the rosuvastatin 40 mg was denied. He was last seen 09/27/2024. His next office visit for a medication visit is due the end of March. He will also need repeat labs at that time. Order placed

## 2025-02-16 ENCOUNTER — APPOINTMENT (OUTPATIENT)
Dept: GENERAL RADIOLOGY | Age: 80
End: 2025-02-16
Attending: EMERGENCY MEDICINE
Payer: MEDICARE

## 2025-02-16 ENCOUNTER — HOSPITAL ENCOUNTER (EMERGENCY)
Age: 80
Discharge: HOME | End: 2025-02-16
Attending: EMERGENCY MEDICINE
Payer: MEDICARE

## 2025-02-16 ENCOUNTER — APPOINTMENT (OUTPATIENT)
Dept: CT IMAGING | Age: 80
End: 2025-02-16
Attending: EMERGENCY MEDICINE
Payer: MEDICARE

## 2025-02-16 VITALS
HEART RATE: 73 BPM | DIASTOLIC BLOOD PRESSURE: 81 MMHG | TEMPERATURE: 98 F | RESPIRATION RATE: 18 BRPM | WEIGHT: 176 LBS | SYSTOLIC BLOOD PRESSURE: 145 MMHG | BODY MASS INDEX: 23.33 KG/M2 | OXYGEN SATURATION: 96 % | HEIGHT: 73 IN

## 2025-02-16 DIAGNOSIS — R03.0 ELEVATED BLOOD PRESSURE READING: ICD-10-CM

## 2025-02-16 DIAGNOSIS — R42 DIZZINESS: Primary | ICD-10-CM

## 2025-02-16 LAB
ALBUMIN SERPL-MCNC: 4.7 G/DL (ref 3.2–4.8)
ALBUMIN/GLOB SERPL: 1.4 {RATIO} (ref 1–2)
ALP LIVER SERPL-CCNC: 76 U/L
ALT SERPL-CCNC: 34 U/L
ANION GAP SERPL CALC-SCNC: 9 MMOL/L (ref 0–18)
AST SERPL-CCNC: 26 U/L (ref ?–34)
BASOPHILS # BLD AUTO: 0.07 X10(3) UL (ref 0–0.2)
BASOPHILS NFR BLD AUTO: 1 %
BILIRUB SERPL-MCNC: 0.8 MG/DL (ref 0.2–1.1)
BUN BLD-MCNC: 16 MG/DL (ref 9–23)
BUN BLD-MCNC: 19 MG/DL (ref 7–18)
CALCIUM BLD-MCNC: 9.3 MG/DL (ref 8.7–10.6)
CHLORIDE BLD-SCNC: 101 MMOL/L (ref 98–112)
CHLORIDE SERPL-SCNC: 102 MMOL/L (ref 98–112)
CO2 BLD-SCNC: 28 MMOL/L (ref 21–32)
CO2 SERPL-SCNC: 28 MMOL/L (ref 21–32)
CREAT BLD-MCNC: 1.08 MG/DL
CREAT BLD-MCNC: 1.1 MG/DL
EGFRCR SERPLBLD CKD-EPI 2021: 67 ML/MIN/1.73M2 (ref 60–?)
EGFRCR SERPLBLD CKD-EPI 2021: 69 ML/MIN/1.73M2 (ref 60–?)
EOSINOPHIL # BLD AUTO: 0.16 X10(3) UL (ref 0–0.7)
EOSINOPHIL NFR BLD AUTO: 2.3 %
ERYTHROCYTE [DISTWIDTH] IN BLOOD BY AUTOMATED COUNT: 12.6 %
GLOBULIN PLAS-MCNC: 3.3 G/DL (ref 2–3.5)
GLUCOSE BLD-MCNC: 100 MG/DL (ref 70–99)
GLUCOSE BLD-MCNC: 101 MG/DL (ref 70–99)
GLUCOSE BLD-MCNC: 113 MG/DL (ref 70–99)
HCT VFR BLD AUTO: 48.2 %
HCT VFR BLD CALC: 43 %
HGB BLD-MCNC: 16.2 G/DL
IMM GRANULOCYTES # BLD AUTO: 0.02 X10(3) UL (ref 0–1)
IMM GRANULOCYTES NFR BLD: 0.3 %
ISTAT IONIZED CALCIUM FOR CHEM 8: 1.13 MMOL/L (ref 1.12–1.32)
LIPASE SERPL-CCNC: 42 U/L (ref 12–53)
LYMPHOCYTES # BLD AUTO: 2.48 X10(3) UL (ref 1–4)
LYMPHOCYTES NFR BLD AUTO: 36.2 %
MCH RBC QN AUTO: 32 PG (ref 26–34)
MCHC RBC AUTO-ENTMCNC: 33.6 G/DL (ref 31–37)
MCV RBC AUTO: 95.3 FL
MONOCYTES # BLD AUTO: 0.57 X10(3) UL (ref 0.1–1)
MONOCYTES NFR BLD AUTO: 8.3 %
NEUTROPHILS # BLD AUTO: 3.56 X10 (3) UL (ref 1.5–7.7)
NEUTROPHILS # BLD AUTO: 3.56 X10(3) UL (ref 1.5–7.7)
NEUTROPHILS NFR BLD AUTO: 51.9 %
OSMOLALITY SERPL CALC.SUM OF ELEC: 290 MOSM/KG (ref 275–295)
PLATELET # BLD AUTO: 224 10(3)UL (ref 150–450)
POCT INFLUENZA A: NEGATIVE
POCT INFLUENZA B: NEGATIVE
POTASSIUM BLD-SCNC: 3.9 MMOL/L (ref 3.6–5.1)
POTASSIUM SERPL-SCNC: 3.8 MMOL/L (ref 3.5–5.1)
PROT SERPL-MCNC: 8 G/DL (ref 5.7–8.2)
RBC # BLD AUTO: 5.06 X10(6)UL
SARS-COV-2 RNA RESP QL NAA+PROBE: NOT DETECTED
SODIUM BLD-SCNC: 141 MMOL/L (ref 136–145)
SODIUM SERPL-SCNC: 139 MMOL/L (ref 136–145)
TROPONIN I BLD-MCNC: <0.02 NG/ML
TROPONIN I SERPL HS-MCNC: <3 NG/L
WBC # BLD AUTO: 6.9 X10(3) UL (ref 4–11)

## 2025-02-16 PROCEDURE — 83690 ASSAY OF LIPASE: CPT | Performed by: EMERGENCY MEDICINE

## 2025-02-16 PROCEDURE — 84484 ASSAY OF TROPONIN QUANT: CPT

## 2025-02-16 PROCEDURE — 85025 COMPLETE CBC W/AUTO DIFF WBC: CPT | Performed by: EMERGENCY MEDICINE

## 2025-02-16 PROCEDURE — 70496 CT ANGIOGRAPHY HEAD: CPT | Performed by: EMERGENCY MEDICINE

## 2025-02-16 PROCEDURE — 84484 ASSAY OF TROPONIN QUANT: CPT | Performed by: EMERGENCY MEDICINE

## 2025-02-16 PROCEDURE — 87502 INFLUENZA DNA AMP PROBE: CPT | Performed by: EMERGENCY MEDICINE

## 2025-02-16 PROCEDURE — 93005 ELECTROCARDIOGRAM TRACING: CPT

## 2025-02-16 PROCEDURE — 36415 COLL VENOUS BLD VENIPUNCTURE: CPT

## 2025-02-16 PROCEDURE — 82962 GLUCOSE BLOOD TEST: CPT

## 2025-02-16 PROCEDURE — 93010 ELECTROCARDIOGRAM REPORT: CPT

## 2025-02-16 PROCEDURE — 80053 COMPREHEN METABOLIC PANEL: CPT | Performed by: EMERGENCY MEDICINE

## 2025-02-16 PROCEDURE — 80047 BASIC METABLC PNL IONIZED CA: CPT

## 2025-02-16 PROCEDURE — 70498 CT ANGIOGRAPHY NECK: CPT | Performed by: EMERGENCY MEDICINE

## 2025-02-16 PROCEDURE — 99285 EMERGENCY DEPT VISIT HI MDM: CPT

## 2025-02-16 PROCEDURE — 99284 EMERGENCY DEPT VISIT MOD MDM: CPT

## 2025-02-16 PROCEDURE — 71045 X-RAY EXAM CHEST 1 VIEW: CPT | Performed by: EMERGENCY MEDICINE

## 2025-02-16 RX ORDER — IOPAMIDOL 755 MG/ML
75 INJECTION, SOLUTION INTRAVASCULAR
Status: COMPLETED | OUTPATIENT
Start: 2025-02-16 | End: 2025-02-16

## 2025-02-16 RX ORDER — MECLIZINE HYDROCHLORIDE 25 MG/1
12.5 TABLET ORAL 3 TIMES DAILY PRN
Qty: 6 TABLET | Refills: 0 | Status: SHIPPED | OUTPATIENT
Start: 2025-02-16

## 2025-02-17 LAB
ATRIAL RATE: 71 BPM
P AXIS: 65 DEGREES
P-R INTERVAL: 182 MS
Q-T INTERVAL: 380 MS
QRS DURATION: 88 MS
QTC CALCULATION (BEZET): 412 MS
R AXIS: 3 DEGREES
T AXIS: 43 DEGREES
VENTRICULAR RATE: 71 BPM

## 2025-02-17 NOTE — ED INITIAL ASSESSMENT (HPI)
Patient here with dizziness that started around 1700. States it started while sitting, watching tv. Denies chest pain. Denies shortness of breath. Alert and oriented 4/4.

## 2025-02-17 NOTE — ED PROVIDER NOTES
Patient Seen in: [facility] In [place]      History     Chief Complaint   Patient presents with    Dizziness     Stated Complaint: dizziness 1700, elevated blood pressure    Subjective:   HPI    This is an 79-year-old gentleman, here for evaluation of dizziness.  States he was sitting on the couch watching TV, suddenly felt dizzy which she describes as feeling off balance.  He was seated, got up off the couch to walk to the bathroom, continue to feel off balance.  Was not really lightheaded sensation symptoms did not change after he got up, did not feel as if he was going to faint.  There was no associated chest pain or shortness of breath.  States continued to feel this dizziness, unclear if it would change if he turns his head.  Denies any nausea or vomiting.  He states his symptoms are bothering him for about 1.5 to 2 hours.  Decided to check his blood pressure and was elevated to 170s systolic which is very unusual for him as he usually runs around 120 systolic.  States he is otherwise been in his usual state of health denies any abdominal pain any vomiting diarrhea any focal weakness or numbness, vision changes headache any other concerning symptoms.  States he essentially feels back to normal at present.    Objective:     Past Medical History:    Cancer of skin, face    Chest pain    High cholesterol    Thyroid nodule    Visual impairment    glasses              Past Surgical History:   Procedure Laterality Date    Cholecystectomy  11    BY Dr. Lord @ [place]    Hernia surgery      Other surgical history      HEEL    Removal gallbladder      Tonsillectomy                  Social History     Socioeconomic History    Marital status:    Tobacco Use    Smoking status: Former     Current packs/day: 0.00     Average packs/day: 1 pack/day for 7.0 years (7.0 ttl pk-yrs)     Types: Cigarettes     Start date: 1961     Quit date: 1968     Years since quittin.1     Smokeless tobacco: Never   Vaping Use    Vaping status: Never Used   Substance and Sexual Activity    Alcohol use: Yes     Comment: 6 drinks a week     Drug use: No    Sexual activity: Yes     Partners: Female   Other Topics Concern    Caffeine Concern No    Exercise Yes     Comment: stretching 20 min every other day     Seat Belt Yes                  Physical Exam     ED Triage Vitals   BP 02/16/25 1923 155/88   Pulse 02/16/25 1923 75   Resp 02/16/25 1923 18   Temp 02/16/25 1929 97.6 °F (36.4 °C)   Temp src 02/16/25 1929 Oral   SpO2 02/16/25 1923 97 %   O2 Device 02/16/25 1923 None (Room air)       Current Vitals:   Vital Signs  BP: 145/81  Pulse: 73  Resp: 18  Temp: 97.6 °F (36.4 °C)  Temp src: Oral    Oxygen Therapy  SpO2: 96 %  O2 Device: None (Room air)        Physical Exam      Physical Exam  Vitals signs and nursing note reviewed.   General:  Patient laying supine in the bed in no acute distress  Head: Normocephalic and atraumatic.   HEENT:  Mucous membranes are moist.   Cardiovascular:  Normal rate and regular rhythm.  No Edema  Pulmonary:  Pulmonary effort is normal.  Normal breath sounds. No wheezing, rhonchi or rales.   Abdominal: Soft nontender nondistended, normal bowel sounds, no guarding no rebound tenderness  Skin: Warm and dry  Neurological: Awake alert, speech is normal, motor 5/5 in bilateral upper and lower extremities.  sensation is grossly intact throughout.  No facial asymmetry.  Stable narrow based gait.  Normal coordination.        ED Course     Labs Reviewed   COMP METABOLIC PANEL (14) - Abnormal; Notable for the following components:       Result Value    Glucose 113 (*)     All other components within normal limits   POCT GLUCOSE - Abnormal; Notable for the following components:    POC Glucose 101 (*)     All other components within normal limits   POCT ISTAT CHEM8 CARTRIDGE - Abnormal; Notable for the following components:    ISTAT BUN 19 (*)     ISTAT Glucose 100 (*)     All other  components within normal limits   LIPASE - Normal   TROPONIN I HIGH SENSITIVITY - Normal   ISTAT TROPONIN - Normal   POCT FLU TEST - Normal    Narrative:     This assay is a rapid molecular in vitro test utilizing nucleic acid amplification of influenza A and B viral RNA.   RAPID SARS-COV-2 BY PCR - Normal   CBC WITH DIFFERENTIAL WITH PLATELET     EKG    Rate, intervals and axes as noted on EKG Report.  Rate: 71  Rhythm: Sinus Rhythm  Reading: No acute ischemic changes                CTA BRAIN + CTA CAROTIDS (CPT=70496/95977)    Addendum Date: 2/16/2025    ADDENDUM:  Three-dimensional image processing was completed using a separate workstation under concurrent supervision.  Dictated by (CST): Liam Osorio MD on 2/16/2025 at 11:18 PM     Finalized by (CST): Liam Osorio MD on 2/16/2025 at 11:19 PM             Result Date: 2/16/2025  PROCEDURE:  CTA BRAIN + CTA CAROTIDS (CPT=70496/95776)  COMPARISON:  None.  INDICATIONS:  dizziness 1700, elevated blood pressure  TECHNIQUE:  CT angiography of the head and neck were obtained with non-ionic contrast.  3D volume rendering images were obtained by the technologist as well as the radiologist on an independent workstation.  Multiplanar 3D reformatted imaging including multiplanar MIP images were obtained.  Curved planar reformats were performed through the carotid and vertebral arteries. All measurements obtained in this exam were performed using NASCET criteria.  Dose reduction techniques were used. Dose information is transmitted to the ACR (American College of Radiology) NRDR (National Radiology Data Registry) which includes the Dose Index Registry.  PATIENT STATED HISTORY:(As transcribed by Technologist)   dizziness since 1700, elevated blood pressure   CONTRAST USED:  75cc of Isovue 370  FINDINGS:   Noncontrast head CT:  No significant midline shift or mass effect is seen.  No evidence of acute intracranial hemorrhage.  Probable mild chronic microvascular ischemic changes  are favored within the white matter.  The visualized paranasal sinuses and mastoid airspaces appear clear.  CTA neck:  There is a 3 vessel aortic arch.  There is no evidence of hemodynamically significant stenosis at either carotid bifurcation based on NASCET criteria.  Vertebral arteries appear codominant.  No evidence of significant stenosis or occlusion within these vessels.  Soft tissues of the neck are essentially unremarkable in appearance.  Streak artifact from dental hardware somewhat limits assessment.  CTA head:  The bilateral high cervical, petrous, cavernous and supraclinoid ICA segments are unremarkable appearance.  The distal branches of the bilateral anterior and middle cerebral arteries are unremarkable.  The basilar artery is normal in caliber.  Distal branches of the bilateral posterior cerebral arteries are unremarkable.             CONCLUSION:  No significant midline shift or mass effect.  No acute intracranial hemorrhage.  Mild chronic microvascular ischemic changes are likely present within the white matter.  If there is persistent clinical concern then consider MRI.  No evidence of large vessel intracranial arterial occlusion.  No evidence of hemodynamically significant stenosis at either carotid bifurcation based on NASCET criteria.    LOCATION:  [facility]   Dictated by (CST): Liam Osorio MD on 2/16/2025 at 9:31 PM     Finalized by (CST): Liam Osorio MD on 2/16/2025 at 9:35 PM       XR CHEST AP PORTABLE  (CPT=71045)    Result Date: 2/16/2025  PROCEDURE:  XR CHEST AP PORTABLE  (CPT=71045)  TECHNIQUE:  AP chest radiograph was obtained.  COMPARISON:  [facility] , XR, XR CHEST AP PORTABLE  (CPT=71010), 12/11/2017, 12:10 PM.  INDICATIONS:  dizziness 1700, elevated blood pressure  PATIENT STATED HISTORY: (As transcribed by Technologist)  Patient states sudden onset of dizziness at 5:00pm today with elevated BP noted in triage. Patient states no chest complaints.    FINDINGS:  Heart size is within normal  limits.  Pleural spaces appear clear.  Mediastinal and hilar contours are normal.  No focal consolidation.            CONCLUSION:  Overall stable appearance of the chest.   LOCATION:  [facility]      Dictated by (CST): Liam Osorio MD on 2/16/2025 at 7:46 PM     Finalized by (CST): Liam Osorio MD on 2/16/2025 at 7:47 PM             MDM      79-year-old gentleman here for evaluation of symptoms of dizziness described as feeling off balance.  Differential includes central versus peripheral vertigo, VBI, electrolyte abnormality ACS cardiac dysrhythmia.  Patient is in normal sinus rhythm here, states his symptoms have essentially resolved, reports blood pressure elevated was elevated to 170s systolic upon arrival.  EKG is normal sinus rhythm troponin is negative, electrolytes unremarkable, CT of the head and neck was performed shows no acute vascular abnormalities.  Patient was monitored in the emergency department for over 3 hours on the cardiac monitor with no appreciation of any dysrhythmias.  He is feeling well otherwise.  Believe this may have been an episode of peripheral vertigo states was brief, resolved.  Recommend he follow-up with his PMD use meclizine as needed if symptoms should recur, return to the ER for anything that persists or worsens in any way.  He is in agreement understands return precautions as we have discussed and agrees with plan.    I independently viewed and interpreted the following imaging: CT head with no acute intracranial hemorrhage        Medical Decision Making      Disposition and Plan     Clinical Impression:  1. Dizziness    2. Elevated blood pressure reading         Disposition:  Discharge  2/16/2025 10:15 pm    Follow-up:  Emery Nunez MD  [address] [place] [address] [id number] [phone]    Follow up  Follow-up with your PMD for reevaluation in 24 to 48 hours.  Return to ER if symptoms worsen or change or if any other new concerns.          Medications  Prescribed:  Discharge Medication List as of 2/16/2025 10:21 PM        START taking these medications    Details   meclizine 25 MG Oral Tab Take 0.5 tablets (12.5 mg total) by mouth 3 (three) times daily as needed., Normal, Disp-6 tablet, R-0                 Supplementary Documentation:

## 2025-02-20 ENCOUNTER — PATIENT OUTREACH (OUTPATIENT)
Dept: CASE MANAGEMENT | Age: 80
End: 2025-02-20

## 2025-02-20 NOTE — PROGRESS NOTES
Patient had recent Emergency Room visit, calling to offer Primary Care Physician follow-up appointment (discharged 02/16)    [place] [address]  [phone]    Attempt #1:  Left message on voicemail for patient to call transitions specialist back to schedule follow up appointments. Provided Transitions specialist scheduling phone number [phone].

## 2025-02-21 NOTE — PROGRESS NOTES
[facility] Hospital Follow up for pcp (discharged 02/16 [facility])      PCP  Emery Nunez  Family Medicine  [address] [place] [id number]  [phone]  Unable to contact pt after multiple attempts  Attempt #2:  Left message on voicemail for patient to call transitions specialist back to schedule follow up appointments. Provided Transitions specialist scheduling phone number [phone]. Closing encounter. Will re-open if patient returns call.

## 2025-03-19 DIAGNOSIS — E78.00 PURE HYPERCHOLESTEROLEMIA: ICD-10-CM

## 2025-03-19 RX ORDER — ROSUVASTATIN CALCIUM 40 MG/1
40 TABLET, COATED ORAL NIGHTLY
Qty: 90 TABLET | Refills: 0 | Status: SHIPPED | OUTPATIENT
Start: 2025-03-19

## 2025-03-19 NOTE — TELEPHONE ENCOUNTER
LOV:  9/27/24    NOV:  None scheduled. Per office note on 9/27/24, patient was asked to follow up yearly for regular physical exams or for any other acute concern.    Last refill: 12/23/24 #90, 0 refills    Medication Detail  Medication Quantity Refills Start End   rosuvastatin 40 MG Oral Tab 90 tablet 0 12/23/2024 --   Sig:   Take 1 tablet (40 mg total) by mouth nightly.     Route:   Oral

## 2025-03-19 NOTE — TELEPHONE ENCOUNTER
Patient called requesting a refill on the rosuvastatin 40 MG Oral Tab     Please send to:  [id number]   [address] AT [facility] [address], [phone], [phone]       Please Advise

## 2025-03-21 ENCOUNTER — LAB ENCOUNTER (OUTPATIENT)
Dept: LAB | Age: 80
End: 2025-03-21
Attending: FAMILY MEDICINE
Payer: MEDICARE

## 2025-03-21 DIAGNOSIS — E78.00 PURE HYPERCHOLESTEROLEMIA: ICD-10-CM

## 2025-03-21 DIAGNOSIS — E78.00 PURE HYPERCHOLESTEROLEMIA: Primary | ICD-10-CM

## 2025-03-21 LAB
ALBUMIN SERPL-MCNC: 4.5 G/DL (ref 3.2–4.8)
ALBUMIN/GLOB SERPL: 1.7 {RATIO} (ref 1–2)
ALP LIVER SERPL-CCNC: 67 U/L
ALT SERPL-CCNC: 30 U/L
ANION GAP SERPL CALC-SCNC: 11 MMOL/L (ref 0–18)
AST SERPL-CCNC: 22 U/L (ref ?–34)
BILIRUB SERPL-MCNC: 1.2 MG/DL (ref 0.2–1.1)
BUN BLD-MCNC: 16 MG/DL (ref 9–23)
CALCIUM BLD-MCNC: 9.7 MG/DL (ref 8.7–10.6)
CHLORIDE SERPL-SCNC: 103 MMOL/L (ref 98–112)
CHOLEST SERPL-MCNC: 185 MG/DL (ref ?–200)
CO2 SERPL-SCNC: 26 MMOL/L (ref 21–32)
CREAT BLD-MCNC: 1.19 MG/DL
EGFRCR SERPLBLD CKD-EPI 2021: 61 ML/MIN/1.73M2 (ref 60–?)
FASTING PATIENT LIPID ANSWER: YES
FASTING STATUS PATIENT QL REPORTED: YES
GLOBULIN PLAS-MCNC: 2.7 G/DL (ref 2–3.5)
GLUCOSE BLD-MCNC: 96 MG/DL (ref 70–99)
HDLC SERPL-MCNC: 61 MG/DL (ref 40–59)
LDLC SERPL CALC-MCNC: 111 MG/DL (ref ?–100)
NONHDLC SERPL-MCNC: 124 MG/DL (ref ?–130)
OSMOLALITY SERPL CALC.SUM OF ELEC: 291 MOSM/KG (ref 275–295)
POTASSIUM SERPL-SCNC: 4.9 MMOL/L (ref 3.5–5.1)
PROT SERPL-MCNC: 7.2 G/DL (ref 5.7–8.2)
SODIUM SERPL-SCNC: 140 MMOL/L (ref 136–145)
TRIGL SERPL-MCNC: 70 MG/DL (ref 30–149)
VLDLC SERPL CALC-MCNC: 12 MG/DL (ref 0–30)

## 2025-03-21 PROCEDURE — 80053 COMPREHEN METABOLIC PANEL: CPT

## 2025-03-21 PROCEDURE — 36415 COLL VENOUS BLD VENIPUNCTURE: CPT

## 2025-03-21 PROCEDURE — 80061 LIPID PANEL: CPT

## 2025-05-09 ENCOUNTER — PATIENT MESSAGE (OUTPATIENT)
Dept: FAMILY MEDICINE CLINIC | Facility: CLINIC | Age: 80
End: 2025-05-09

## 2025-06-13 DIAGNOSIS — E78.00 PURE HYPERCHOLESTEROLEMIA: ICD-10-CM

## 2025-06-13 NOTE — TELEPHONE ENCOUNTER
Requested Prescriptions     Pending Prescriptions Disp Refills    ROSUVASTATIN 40 MG Oral Tab [Pharmacy Med Name: ROSUVASTATIN 40MG TABLETS] 90 tablet 0     Sig: TAKE 1 TABLET(40 MG) BY MOUTH EVERY NIGHT     Last refill 3/19/25 #90  LOV 9/27/25  Future Appointments   Date Time Provider Department Center   6/16/2025 10:00 AM Emery Nunez MD [facility] [id number]     Will hold for appointment as patient should have enough medication until then

## 2025-06-16 ENCOUNTER — OFFICE VISIT (OUTPATIENT)
Dept: FAMILY MEDICINE CLINIC | Facility: CLINIC | Age: 80
End: 2025-06-16
Payer: COMMERCIAL

## 2025-06-16 VITALS
WEIGHT: 176 LBS | HEART RATE: 72 BPM | DIASTOLIC BLOOD PRESSURE: 78 MMHG | TEMPERATURE: 97 F | HEIGHT: 73.82 IN | SYSTOLIC BLOOD PRESSURE: 122 MMHG | RESPIRATION RATE: 18 BRPM | BODY MASS INDEX: 22.59 KG/M2

## 2025-06-16 DIAGNOSIS — Z12.5 SCREENING FOR MALIGNANT NEOPLASM OF PROSTATE: ICD-10-CM

## 2025-06-16 DIAGNOSIS — Z00.00 MEDICARE ANNUAL WELLNESS VISIT, SUBSEQUENT: ICD-10-CM

## 2025-06-16 DIAGNOSIS — E78.00 PURE HYPERCHOLESTEROLEMIA: ICD-10-CM

## 2025-06-16 RX ORDER — ROSUVASTATIN CALCIUM 40 MG/1
40 TABLET, COATED ORAL NIGHTLY
Qty: 90 TABLET | Refills: 0 | OUTPATIENT
Start: 2025-06-16

## 2025-06-16 RX ORDER — ROSUVASTATIN CALCIUM 40 MG/1
40 TABLET, COATED ORAL NIGHTLY
Qty: 90 TABLET | Refills: 0 | Status: SHIPPED | OUTPATIENT
Start: 2025-06-16

## 2025-06-16 NOTE — PROGRESS NOTES
Subjective:   Hever Curtis is a 79 year old male who presents for a MA AHA (Medicare Advantage Annual Health Assessment) and Subsequent Annual Wellness visit (Pt already had Initial Annual Wellness) and scheduled follow up of multiple significant but stable problems.                 Patient has a past medical history of hypercholesterolemia.  Patient states that he has been taking his medications as prescribed he denies any acute concerns today.  Denies any chest pain nausea vomiting diarrhea constipation.        History/Other:   Fall Risk Assessment:   He has been screened for Falls and is low risk.      Cognitive Assessment:   He had a completely normal cognitive assessment - see flowsheet entries     Functional Ability/Status:   Hever Curtis has a completely normal functional assessment. See flowsheet for details.      Depression Screening (PHQ):  PHQ-2 SCORE: 0  , done 6/9/2025          Advanced Directives:   He does have a Living Will but we do NOT have it on file in Epic.    He does have a POA but we do NOT have it on file in Epic.    Discussed Advance Care Planning with patient (and family/surrogate if present). Standard forms made available to patient in After Visit Summary.      Problem List[1]  Allergies:  He has no known allergies.    Current Medications:  Active Meds, Sig Only[2]    Medical History:  He  has a past medical history of Cancer of skin, face, Chest pain, High cholesterol, Thyroid nodule, and Visual impairment.  Surgical History:  He  has a past surgical history that includes tonsillectomy (1947); hernia surgery (1995); other surgical history (1994); cholecystectomy (12/29/11); and removal gallbladder.   Family History:  His family history includes Cancer in his father and sister; Stroke in his mother.  Social History:  He  reports that he quit smoking about 57 years ago. His smoking use included cigarettes. He started smoking about 64 years ago. He has a 7 pack-year smoking  history. He has never used smokeless tobacco. He reports current alcohol use. He reports that he does not use drugs.    Tobacco:  He smoked tobacco in the past but quit greater than 12 months ago.  Tobacco Use[3]     CAGE Alcohol Screen:   CAGE screening score of 0 on 6/9/2025, showing low risk of alcohol abuse.      Patient Care Team:  Emery Nunez MD as PCP - General  Elsie Dow MD as Consulting Physician (NEUROLOGY)  Fatoumata Lipscomb PT as Physical Therapist  Juju Soto PT as Physical Therapist  Grazyna Wayne PT, DPT, Cert.MDT as Physical Therapist (Physical Therapy)  Angie Leonard DO as Consulting Physician (NEUROLOGY)    Review of Systems  Consitutional: No fevers, chills, sweats  Eye: No recent visual problems  ENMT: No ear pain nasal congestion sore throat  Respiratory: No shortness of breath, cough  Cardiovascular: No chest pain palpitations syncope  Gastrointestinal: No nausea vomiting diarrhea  Genitourinary: No hematuria  Hema/Lymph no bruising tendency, swollen lymph glands  Endocrine: Negative for excessive thirst excessive hunger  Musculoskeletal: No back pain neck pain joint pain muscle pain decreased range of motion  Integumentary: No rash, pruritus, abrasions  Neurologic: Alert and oriented x4  Psychiatric: No anxiety, depression    Medical, surgical, family, and social histories were reviewed      Objective:   Physical Exam      VITALS: Vitals reviewed   GENERAL: well developed, well nourished, in no apparent distress  SKIN: no rashes, no suspicious lesions: Cool and Dry  HEENT: atraumatic, normocephalic, ears and throat are clear bilateral tympanic membranes lucent nonbulging intact nose without bleeding purulent discharge or septal hematoma.    EYES: Pupils equal round and reactive.  Extraocular motions intact no scleral icterus no injection or drainage  THROAT without erythema tonsillar hypertrophy or exudate.  Uvula midline airway patent                Hearing  Assessed via: Whispered Voice  NECK: trachea midline.  No JVD or lymphadenopathy supple nontender no meningeal signs   LUNGS: clear to auscultation sounds equal bilaterally no wheezes rales or rhonchi  CARDIO: Regular rate and rhythm without murmurs gallops or rubs  GI: BS + Soft nontender nondistended no hepatomegaly palpable masses.  No guarding.  BACK non tender without deformity or crepitus no flank tenderness  EXTREMITIES: no cyanosis, clubbing or edema no joint tenderness effusion or edema noted.  No calf tenderness negative Homans' sign bilaterally  NEURO: Awake and alert.  Cranial nerves II through XII intact motor and sensory grossly within normal limits.  5 out of 5 muscle strength in all muscle groups.  Normal speech.  PSYCHIATRIC: Awake, alert and oriented x3, cooperative appropriate mood and affect.  Judgment intact        /78   Pulse 72   Temp 97.1 °F (36.2 °C) (Temporal)   Resp 18   Ht 6' 1.82\" (1.875 m)   Wt 176 lb (79.8 kg)   BMI 22.71 kg/m²  Estimated body mass index is 22.71 kg/m² as calculated from the following:    Height as of this encounter: 6' 1.82\" (1.875 m).    Weight as of this encounter: 176 lb (79.8 kg).    Medicare Hearing Assessment:   Hearing Screening    Time taken: 6/16/2025 10:09 AM  Entry User: Angie Fuentes MA  Screening Method: Finger Rub         Visual Acuity:   Right Eye Visual Acuity: Corrected Right Eye Chart Acuity: 20/25   Left Eye Visual Acuity: Corrected Left Eye Chart Acuity: 20/25   Both Eyes Visual Acuity: Corrected Both Eyes Chart Acuity: 20/25   Able To Tolerate Visual Acuity: Yes        Assessment & Plan:   Hever Curtis is a 79 year old male who presents for a Medicare Assessment.     1. Medicare annual wellness visit, subsequent      I did discuss with the patient at this time to update blood work in September.  He is up-to-date with all other preventative measures.  He was asked to follow-up yearly for regular physical exams.  He may  need a CMP lipid panel as well as PSA      2. Pure hypercholesterolemia  -     Rosuvastatin Calcium; Take 1 tablet (40 mg total) by mouth nightly.  Dispense: 90 tablet; Refill: 0    Patient's cholesterol has been well-controlled he is currently on rosuvastatin he was asked to continue to take the medication as prescribed he was asked to update his blood work in September.      3. Screening for malignant neoplasm of prostate  -     PSA Total, Screen; Future; Expected date: 09/16/2025              The patient presented today for Medicare annual wellness visit.  During the course of today's visit the health risk assessment past medical family and social histories were updated and reviewed with the patient.  The patient was educated and counseled about appropriate screening and preventative services and these were ordered as appropriate.  Referrals for educational and counseling services were made where indicated.  Advance directives were discussed.  A copy of the recommended preventative screenings as well as discharge instructions were provided to the patient.  End-of-life planning was discussed advanced directives were also discussed and are in place.        The patient indicates understanding of these issues and agrees to the plan.  Lab work ordered.  Reinforced healthy diet, lifestyle, and exercise.      No follow-ups on file.     Emery Nunez MD, 6/16/2025     Supplementary Documentation:   General Health:  In the past six months, have you lost more than 10 pounds without trying?: (Patient-Rptd) 2 - No  Has your appetite been poor?: (Patient-Rptd) No  Type of Diet: (Patient-Rptd) Balanced  How does the patient maintain a good energy level?: (Patient-Rptd) Appropriate Exercise  How would you describe your daily physical activity?: (Patient-Rptd) Light  How would you describe your current health state?: (Patient-Rptd) Good  How do you maintain positive mental well-being?: (Patient-Rptd) Social Interaction,  Games, Visiting Friends, Visiting Family  On a scale of 0 to 10, with 0 being no pain and 10 being severe pain, what is your pain level?: (Patient-Rptd) 1 - (Mild)  In the past six months, have you experienced urine leakage?: (Patient-Rptd) 0-No  At any time do you feel concerned for the safety/well-being of yourself and/or your children, in your home or elsewhere?: (Patient-Rptd) No  Have you had any immunizations at another office such as Influenza, Hepatitis B, Tetanus, or Pneumococcal?: (Patient-Rptd) Yes    Health Maintenance   Topic Date Due    COVID-19 Vaccine (6 - 2024-25 season) 09/01/2024    Annual Well Visit  01/01/2025    Annual Depression Screening  01/01/2025    Influenza Vaccine (Season Ended) 10/01/2025    Fall Risk Screening (Annual)  Completed    Pneumococcal Vaccine: 50+ Years  Completed    Zoster Vaccines  Completed    Meningococcal B Vaccine  Aged Out            [1]   Patient Active Problem List  Diagnosis    Disturbance of skin sensation    Pure hypercholesterolemia    Multinodular thyroid    Erectile dysfunction    Arthrosis of right acromioclavicular joint    Rotator cuff syndrome of right shoulder    Chronic pain of both hips    Tremors of nervous system    Ischial bursitis of left side   [2]   Outpatient Medications Marked as Taking for the 6/16/25 encounter (Office Visit) with Emery Nunez MD   Medication Sig    NON FORMULARY Take 18 tablets by mouth in the morning.    rosuvastatin 40 MG Oral Tab Take 1 tablet (40 mg total) by mouth nightly.    CLOBETASOL 0.05 % External Solution APPLY 1 ML TOPICALLY TO THE AFFECTED AREA EVERY NIGHT    Cholecalciferol (VITAMIN D) 1000 UNITS Oral Cap Take 2,000 Units by mouth daily.    Omega-3 Fatty Acids (FISH OIL) 1000 MG Oral Cap Take  by mouth daily.    Coenzyme Q10 (COQ10) 100 MG Oral Cap Take 1 capsule by mouth daily.   [3]   Social History  Tobacco Use   Smoking Status Former    Current packs/day: 0.00    Average packs/day: 1 pack/day for 7.0  years (7.0 ttl pk-yrs)    Types: Cigarettes    Start date: 1961    Quit date: 1968    Years since quittin.4   Smokeless Tobacco Never

## 2025-07-24 ENCOUNTER — APPOINTMENT (OUTPATIENT)
Dept: URBAN - METROPOLITAN AREA CLINIC 247 | Age: 80
Setting detail: DERMATOLOGY
End: 2025-07-24

## 2025-07-24 DIAGNOSIS — Z85.828 PERSONAL HISTORY OF OTHER MALIGNANT NEOPLASM OF SKIN: ICD-10-CM

## 2025-07-24 DIAGNOSIS — L82.1 OTHER SEBORRHEIC KERATOSIS: ICD-10-CM

## 2025-07-24 DIAGNOSIS — L57.0 ACTINIC KERATOSIS: ICD-10-CM

## 2025-07-24 DIAGNOSIS — D22 MELANOCYTIC NEVI: ICD-10-CM

## 2025-07-24 DIAGNOSIS — D18.0 HEMANGIOMA: ICD-10-CM

## 2025-07-24 DIAGNOSIS — D485 NEOPLASM OF UNCERTAIN BEHAVIOR OF SKIN: ICD-10-CM

## 2025-07-24 DIAGNOSIS — L81.4 OTHER MELANIN HYPERPIGMENTATION: ICD-10-CM

## 2025-07-24 PROBLEM — D18.01 HEMANGIOMA OF SKIN AND SUBCUTANEOUS TISSUE: Status: ACTIVE | Noted: 2025-07-24

## 2025-07-24 PROBLEM — D48.5 NEOPLASM OF UNCERTAIN BEHAVIOR OF SKIN: Status: ACTIVE | Noted: 2025-07-24

## 2025-07-24 PROBLEM — D22.5 MELANOCYTIC NEVI OF TRUNK: Status: ACTIVE | Noted: 2025-07-24

## 2025-07-24 PROCEDURE — OTHER BIOPSY BY SHAVE METHOD: OTHER

## 2025-07-24 PROCEDURE — 99213 OFFICE O/P EST LOW 20 MIN: CPT | Mod: 25

## 2025-07-24 PROCEDURE — OTHER LIQUID NITROGEN: OTHER

## 2025-07-24 PROCEDURE — OTHER MIPS QUALITY: OTHER

## 2025-07-24 PROCEDURE — 17004 DESTROY PREMAL LESIONS 15/>: CPT

## 2025-07-24 PROCEDURE — 11102 TANGNTL BX SKIN SINGLE LES: CPT | Mod: 59

## 2025-07-24 PROCEDURE — OTHER COUNSELING: OTHER

## 2025-07-24 ASSESSMENT — LOCATION SIMPLE DESCRIPTION DERM
LOCATION SIMPLE: LEFT CHEEK
LOCATION SIMPLE: RIGHT ZYGOMA
LOCATION SIMPLE: RIGHT TEMPLE
LOCATION SIMPLE: LEFT EAR
LOCATION SIMPLE: RIGHT CHEEK
LOCATION SIMPLE: LEFT UPPER BACK
LOCATION SIMPLE: RIGHT UPPER BACK
LOCATION SIMPLE: RIGHT FOREHEAD
LOCATION SIMPLE: NOSE
LOCATION SIMPLE: LEFT ANKLE

## 2025-07-24 ASSESSMENT — LOCATION ZONE DERM
LOCATION ZONE: FACE
LOCATION ZONE: LEG
LOCATION ZONE: TRUNK
LOCATION ZONE: NOSE
LOCATION ZONE: EAR

## 2025-07-24 ASSESSMENT — LOCATION DETAILED DESCRIPTION DERM
LOCATION DETAILED: LEFT POSTERIOR ANKLE
LOCATION DETAILED: RIGHT CENTRAL MALAR CHEEK
LOCATION DETAILED: NASAL TIP
LOCATION DETAILED: LEFT SUPERIOR LATERAL MALAR CHEEK
LOCATION DETAILED: RIGHT INFERIOR MEDIAL FOREHEAD
LOCATION DETAILED: RIGHT CENTRAL ZYGOMA
LOCATION DETAILED: RIGHT MID-UPPER BACK
LOCATION DETAILED: LEFT SUPERIOR UPPER BACK
LOCATION DETAILED: RIGHT SUPERIOR LATERAL MALAR CHEEK
LOCATION DETAILED: LEFT LATERAL BUCCAL CHEEK
LOCATION DETAILED: RIGHT CENTRAL TEMPLE
LOCATION DETAILED: LEFT INFERIOR LATERAL MALAR CHEEK
LOCATION DETAILED: LEFT SUPERIOR HELIX
LOCATION DETAILED: LEFT SUPERIOR MEDIAL UPPER BACK
LOCATION DETAILED: LEFT INFERIOR CENTRAL MALAR CHEEK
LOCATION DETAILED: RIGHT FOREHEAD
LOCATION DETAILED: RIGHT INFERIOR UPPER BACK

## 2025-08-27 ENCOUNTER — OFFICE VISIT (OUTPATIENT)
Dept: FAMILY MEDICINE CLINIC | Facility: CLINIC | Age: 80
End: 2025-08-27

## 2025-08-27 VITALS
RESPIRATION RATE: 16 BRPM | OXYGEN SATURATION: 98 % | SYSTOLIC BLOOD PRESSURE: 108 MMHG | HEIGHT: 73 IN | BODY MASS INDEX: 22.93 KG/M2 | DIASTOLIC BLOOD PRESSURE: 60 MMHG | HEART RATE: 74 BPM | TEMPERATURE: 97 F | WEIGHT: 173 LBS

## 2025-08-27 DIAGNOSIS — R05.3 CHRONIC COUGH: Primary | ICD-10-CM

## 2025-08-27 PROCEDURE — G2211 COMPLEX E/M VISIT ADD ON: HCPCS | Performed by: FAMILY MEDICINE

## 2025-08-27 PROCEDURE — 99214 OFFICE O/P EST MOD 30 MIN: CPT | Performed by: FAMILY MEDICINE

## 2025-08-27 PROCEDURE — 3008F BODY MASS INDEX DOCD: CPT | Performed by: FAMILY MEDICINE

## 2025-08-27 PROCEDURE — 1159F MED LIST DOCD IN RCRD: CPT | Performed by: FAMILY MEDICINE

## 2025-08-27 PROCEDURE — 1160F RVW MEDS BY RX/DR IN RCRD: CPT | Performed by: FAMILY MEDICINE

## 2025-08-27 PROCEDURE — 3078F DIAST BP <80 MM HG: CPT | Performed by: FAMILY MEDICINE

## 2025-08-27 PROCEDURE — 3074F SYST BP LT 130 MM HG: CPT | Performed by: FAMILY MEDICINE

## 2025-08-27 RX ORDER — PANTOPRAZOLE SODIUM 40 MG/1
40 TABLET, DELAYED RELEASE ORAL
Qty: 90 TABLET | Refills: 0 | Status: SHIPPED | OUTPATIENT
Start: 2025-08-27 | End: 2025-11-25

## 2025-08-27 RX ORDER — FLUTICASONE PROPIONATE 50 MCG
1 SPRAY, SUSPENSION (ML) NASAL 2 TIMES DAILY
Qty: 1 EACH | Refills: 0 | Status: SHIPPED | OUTPATIENT
Start: 2025-08-27 | End: 2026-08-22

## (undated) DIAGNOSIS — M16.10 HIP ARTHRITIS: Primary | ICD-10-CM

## (undated) DIAGNOSIS — M70.72 ISCHIAL BURSITIS OF LEFT SIDE: Primary | ICD-10-CM

## (undated) DIAGNOSIS — M54.50 LOW BACK PAIN, UNSPECIFIED BACK PAIN LATERALITY, UNSPECIFIED CHRONICITY, UNSPECIFIED WHETHER SCIATICA PRESENT: ICD-10-CM

## (undated) DIAGNOSIS — G89.29 OTHER CHRONIC PAIN: Primary | ICD-10-CM

## (undated) DIAGNOSIS — L21.0 DANDRUFF IN ADULT: ICD-10-CM

## (undated) DEVICE — REMOVER PREP SOLUTION 4OZ

## (undated) DEVICE — REMOVER DURAPREP 3M

## (undated) DEVICE — NEEDLE SPINAL 22X3-1/2 BLK

## (undated) DEVICE — NEEDLE SPINAL 22X5 405148

## (undated) DEVICE — GLOVE SURG SENSICARE SZ 7-1/2

## (undated) DEVICE — PAIN TRAY: Brand: MEDLINE INDUSTRIES, INC.

## (undated) DEVICE — SKIN MARKER DUAL TIP WITH RULER CAP AND LABELS: Brand: DEVON

## (undated) DEVICE — MARKER SKIN 2 TIP

## (undated) DEVICE — BANDAID COVERLET 1X3

## (undated) DEVICE — GLOVE SURG SENSICARE SZ 6-1/2

## (undated) DEVICE — BANDAID CURAD 3IN X 1IN

## (undated) DEVICE — GLOVE SURG SENSICARE SZ 7

## (undated) NOTE — MR AVS SNAPSHOT
[facility], [address] [id number]               Thank you for choosing us for your health care visit with Will Abbott MD.  We are glad to serve you and happy to provide you with this pepe Cholesterol, covered every 5 yrs including Total, LDL and Trigs LDL CHOLESTEROL (mg/dL)   Date Value   12/09/2016 98     LDL-CHOLESTEROL (mg/dL (calc))   Date Value   11/11/2011 134*     LDL CHOLESTROL (mg/dL)   Date Value   05/21/2014 120     CHOLESTEROL No results found for this or any previous visit. Annually (age 48 or over), FARHAD not paid separately when covered E/M service is provided on same date    Immunizations      Influenza  Covered Annually No orders found for this or any previous visit.  Please This link also has information from the [address] [place] [address] regarding Advance Directives. Follow Up with Our Office     Return in 1 year (on 2/7/2018).       Allergies as of Feb 07, 2017     No Known Allergies                Today's Vital [email or web address]

## (undated) NOTE — LETTER
Patient Name: Ju Flores  YOB: 1945          MRN number:  [id number]  Date:  10/31/2018  Referring Physician:  Inna Mancilla          SPINE EVALUATION:    Referring Physician: Dr. Christel Horn  Diagnosis: right shoulder sub acromial impi Lito Laurent would benefit from skilled Physical Therapy to address the above impairments to decrease pain, improve sleep hygiene, improve ROM, improve strength and return to PLOF.      Precautions:  None  OBJECTIVE:   Observation/Posture: winging noted  In the r · Pt will improve shoulder flexion AROM to >160 degrees to be able to reach into overhead cabinets without pain or restriction (12 visits)  · Pt will improve shoulder abduction AROM to >150 degrees to improve ability to don deodorant, don/doff shirts, and

## (undated) NOTE — LETTER
Patient Name: Hunter Trujillo  YOB: 1945          MRN number:  [id number]  Date:  2/5/2019  Referring Physician:  Maricel Duval     Progress Summary    Pt has attended 5 of 8 visits since last progress note in Physical Therapy.    Subjectiv · Pt will improve shoulder strength to 5-/5 to improve function with ADLs such as carrying a bag of groceries   Pt will demonstrate increased mid/low trap strength to 5/5 to promote improved shoulder mechanics and stabilization with ADLs such as lifting an

## (undated) NOTE — LETTER
08/20/21        Bradley Dupree [id number]      Dear Vladimir Gutierrez,    [facility] records indicate that you have outstanding lab work and or testing that was ordered for you and has not yet been completed:  Orders Placed This Encounter

## (undated) NOTE — LETTER
Patient Name: Manda Sánchez  YOB: 1945          MRN number:  [id number]  Date:  1/3/2019  Referring Physician:  Kurt Anderson     Progress Summary    Pt has attended 15, cancelled 0, and no shown 0 visits in Physical Therapy.    Subjectiv reach into overhead cabinets without pain or restriction  MET 12/20/2018   · Pt will improve shoulder abduction AROM to >150 degrees to improve ability to don deodorant, don/doff shirts, and wash hair  · Pt will improve shoulder strength to 5-/5 to improve

## (undated) NOTE — LETTER
1/29/2018    Bella Aburto [id number]    Dear Mr. Glo Blanton office has been trying to contact you to discuss your recent test results or you are due for an appointment with your provider. It is important that we reach you to discuss your healthcare needs. Please contact our office at your earliest convenience. Also,  Did you know that you can receive test result information and reminders securely on line through [address] [place] [address] [address]? To register for [employer], open your Internet browser and go to [email or web address] and click \"sign up now\". Follow the on-screen instructions to complete your registration. Thank you for your prompt attention to this matter. You may reach our office at [phone].      Sincerely,      [employer] and Staff

## (undated) NOTE — LETTER
Patient Name: Michelle Hough  YOB: 1945          MRN number:  [id number]  Date:  3/6/2019  Referring Physician:  Claudia Mcdaniel   Discharge Summary    Pt has attended 24, cancelled 2, and no shown 1 visits in Physical Therapy.    Subjective Pt will demonstrate increased mid/low trap strength to 5/5 to promote improved shoulder mechanics and stabilization with ADLs such as lifting and reaching  MET 2/5/2019     Plan: DC with HEP      Patient/Family/Caregiver was advised of these findings, prec

## (undated) NOTE — ED AVS SNAPSHOT
Haris Forde   MRN: [id number]    Department:  [facility] Emergency Department   Date of Visit:  12/11/2017           Disclosure     Insurance plans vary and the physician(s) referred by the ER may not be covered by your plan.  Please contact tell this physician (or your personal doctor if your instructions are to return to your personal doctor) about any new or lasting problems. The primary care or specialist physician will see patients referred from the [facility] Emergency Department.  Waldo Mathews

## (undated) NOTE — MR AVS SNAPSHOT
After Visit Summary   2/7/2023    Ankush Mosquera   MRN: [id number]           Visit Information     Date & Time  2/7/2023 10:00 AM Provider  Rodolfo Gaytan, [address] [place] [address] Neurodiagnostics Dept. Phone  [phone]      Allergies as of 2/7/2023  Review status set to Review Complete on 6/20/2022   No Known Allergies     Your Current Medications        Dosage    rosuvastatin 40 MG Oral Tab Take 1 tablet (40 mg total) by mouth nightly. Clobetasol Propionate 0.05 % External Solution Apply 1 mL topically nightly. Cholecalciferol (VITAMIN D) 1000 UNITS Oral Cap Take 2,000 Units by mouth daily. Only in winter     Omega-3 Fatty Acids (FISH OIL) 1000 MG Oral Cap Take  by mouth daily. Multiple Vitamins-Minerals (CENTRUM SILVER) Oral Tab Take 1 Tab by mouth daily. Coenzyme Q10 (COQ10) 100 MG Oral Cap Take 1 capsule by mouth daily. Diagnoses for This Visit    Tremors of nervous system   [418310]             We Ordered the Following     Normal Orders This Visit    EMG [NEU5 CUSTOM]                 Did you know that [facility] primary care physicians now offer Video Visits through [address] for adult patients for a variety of conditions such as allergies, back pain and cold symptoms? Skip the drive and waiting room and online chat with a doctor face-to-face using your web-cam enabled computer or mobile device wherever you are. Video Visits cost $50 and can be paid hassle-free using a credit, debit, or health savings card. Not active on [employer]? Ask us how to get signed up today! If you receive a survey from [employer], please take a few minutes to complete it and provide feedback. We strive to deliver the best patient experience and are looking for ways to make improvements. Your feedback will help us do so. For more information on [employer], please visit [email or web address]           No text in SmartText           No text in SmartText

## (undated) NOTE — LETTER
[address] [address] [address], [facility] [address]  [phone]          2020  Re: Ana Landaverde  : 1945    To whom it may concern,     Mr. Ana Landaverde has an orthopedic condition